# Patient Record
Sex: MALE | Race: WHITE | Employment: OTHER | URBAN - METROPOLITAN AREA
[De-identification: names, ages, dates, MRNs, and addresses within clinical notes are randomized per-mention and may not be internally consistent; named-entity substitution may affect disease eponyms.]

---

## 2021-06-14 PROBLEM — K59.00 CONSTIPATION: Status: ACTIVE | Noted: 2021-06-14

## 2021-06-14 PROBLEM — K62.4 ANAL STENOSIS: Status: ACTIVE | Noted: 2021-06-14

## 2021-06-15 ENCOUNTER — HOSPITAL ENCOUNTER (OUTPATIENT)
Dept: CT IMAGING | Age: 86
Discharge: HOME | End: 2021-06-15
Attending: SURGERY
Payer: COMMERCIAL

## 2021-06-15 DIAGNOSIS — K59.00 CONSTIPATION, UNSPECIFIED CONSTIPATION TYPE: ICD-10-CM

## 2021-06-15 DIAGNOSIS — K62.4 ANAL STENOSIS: ICD-10-CM

## 2021-06-15 LAB — CREAT BLD-MCNC: 1.58 MG/DL (ref 0.8–1.5)

## 2021-06-15 PROCEDURE — 82565 ASSAY OF CREATININE: CPT

## 2021-06-15 PROCEDURE — 74177 CT ABD & PELVIS W/CONTRAST: CPT

## 2021-06-15 PROCEDURE — 74011000636 HC RX REV CODE- 636: Performed by: SURGERY

## 2021-06-15 PROCEDURE — 74011000258 HC RX REV CODE- 258: Performed by: SURGERY

## 2021-06-15 RX ORDER — SODIUM CHLORIDE 0.9 % (FLUSH) 0.9 %
10 SYRINGE (ML) INJECTION
Status: COMPLETED | OUTPATIENT
Start: 2021-06-15 | End: 2021-06-15

## 2021-06-15 RX ADMIN — Medication 10 ML: at 13:12

## 2021-06-15 RX ADMIN — IOPAMIDOL 100 ML: 755 INJECTION, SOLUTION INTRAVENOUS at 13:11

## 2021-06-15 RX ADMIN — DIATRIZOATE MEGLUMINE AND DIATRIZOATE SODIUM 15 ML: 660; 100 LIQUID ORAL; RECTAL at 13:12

## 2021-06-15 RX ADMIN — SODIUM CHLORIDE 100 ML: 900 INJECTION, SOLUTION INTRAVENOUS at 13:12

## 2021-06-21 PROBLEM — Z86.010 HX OF ADENOMATOUS COLONIC POLYPS: Status: ACTIVE | Noted: 2021-06-21

## 2022-03-18 PROBLEM — K59.00 CONSTIPATION: Status: ACTIVE | Noted: 2021-06-14

## 2022-03-18 PROBLEM — K62.4 ANAL STENOSIS: Status: ACTIVE | Noted: 2021-06-14

## 2022-03-19 PROBLEM — Z86.010 HX OF ADENOMATOUS COLONIC POLYPS: Status: ACTIVE | Noted: 2021-06-21

## 2023-12-14 ENCOUNTER — HOSPITAL ENCOUNTER (EMERGENCY)
Age: 88
Discharge: HOME | End: 2023-12-14
Attending: EMERGENCY MEDICINE
Payer: MEDICARE

## 2023-12-14 ENCOUNTER — APPOINTMENT (OUTPATIENT)
Dept: GENERAL RADIOLOGY | Age: 88
End: 2023-12-14
Payer: MEDICARE

## 2023-12-14 VITALS
TEMPERATURE: 97.3 F | WEIGHT: 152.12 LBS | HEIGHT: 64 IN | HEART RATE: 78 BPM | RESPIRATION RATE: 9 BRPM | SYSTOLIC BLOOD PRESSURE: 109 MMHG | OXYGEN SATURATION: 100 % | BODY MASS INDEX: 25.97 KG/M2 | DIASTOLIC BLOOD PRESSURE: 79 MMHG
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
## 2023-12-14 DIAGNOSIS — R42 LIGHTHEADED: ICD-10-CM

## 2023-12-14 LAB
ALBUMIN SERPL-MCNC: 2.6 G/DL (ref 3.2–4.6)
ALBUMIN/GLOB SERPL: 0.6 (ref 0.4–1.6)
ALP SERPL-CCNC: 70 U/L (ref 50–136)
ALT SERPL-CCNC: 17 U/L (ref 12–65)
ANION GAP SERPL CALC-SCNC: 4 MMOL/L (ref 2–11)
AST SERPL-CCNC: 26 U/L (ref 15–37)
BASOPHILS # BLD: 0 K/UL (ref 0–0.2)
BASOPHILS NFR BLD: 0 % (ref 0–2)
BILIRUB SERPL-MCNC: 0.4 MG/DL (ref 0.2–1.1)
BUN SERPL-MCNC: 27 MG/DL (ref 8–23)
CALCIUM SERPL-MCNC: 8 MG/DL (ref 8.3–10.4)
CHLORIDE SERPL-SCNC: 104 MMOL/L (ref 103–113)
CO2 SERPL-SCNC: 29 MMOL/L (ref 21–32)
CREAT SERPL-MCNC: 1.5 MG/DL (ref 0.8–1.5)
DIFFERENTIAL METHOD BLD: ABNORMAL
EKG ATRIAL RATE: 0 BPM
EKG DIAGNOSIS: NORMAL
EKG Q-T INTERVAL: 444 MS
EKG QRS DURATION: 128 MS
EKG QTC CALCULATION (BAZETT): 490 MS
EKG R AXIS: -66 DEGREES
EKG T AXIS: 82 DEGREES
EKG VENTRICULAR RATE: 73 BPM
EOSINOPHIL # BLD: 0 K/UL (ref 0–0.8)
EOSINOPHIL NFR BLD: 0 % (ref 0.5–7.8)
ERYTHROCYTE [DISTWIDTH] IN BLOOD BY AUTOMATED COUNT: 13.5 % (ref 11.9–14.6)
FLUAV RNA SPEC QL NAA+PROBE: NOT DETECTED
FLUBV RNA SPEC QL NAA+PROBE: NOT DETECTED
GLOBULIN SER CALC-MCNC: 4.1 G/DL (ref 2.8–4.5)
GLUCOSE SERPL-MCNC: 144 MG/DL (ref 65–100)
HCT VFR BLD AUTO: 35.4 % (ref 41.1–50.3)
HGB BLD-MCNC: 11.6 G/DL (ref 13.6–17.2)
IMM GRANULOCYTES # BLD AUTO: 0 K/UL (ref 0–0.5)
IMM GRANULOCYTES NFR BLD AUTO: 0 % (ref 0–5)
LYMPHOCYTES # BLD: 1.4 K/UL (ref 0.5–4.6)
LYMPHOCYTES NFR BLD: 14 % (ref 13–44)
MCH RBC QN AUTO: 29.5 PG (ref 26.1–32.9)
MCHC RBC AUTO-ENTMCNC: 32.8 G/DL (ref 31.4–35)
MCV RBC AUTO: 90.1 FL (ref 82–102)
MONOCYTES # BLD: 0.5 K/UL (ref 0.1–1.3)
MONOCYTES NFR BLD: 5 % (ref 4–12)
NEUTS SEG # BLD: 8 K/UL (ref 1.7–8.2)
NEUTS SEG NFR BLD: 81 % (ref 43–78)
NRBC # BLD: 0 K/UL (ref 0–0.2)
PLATELET # BLD AUTO: 270 K/UL (ref 150–450)
PMV BLD AUTO: 10 FL (ref 9.4–12.3)
POTASSIUM SERPL-SCNC: 2.9 MMOL/L (ref 3.5–5.1)
PROT SERPL-MCNC: 6.7 G/DL (ref 6.3–8.2)
RBC # BLD AUTO: 3.93 M/UL (ref 4.23–5.6)
SARS-COV-2 RDRP RESP QL NAA+PROBE: NOT DETECTED
SODIUM SERPL-SCNC: 137 MMOL/L (ref 136–146)
SOURCE: NORMAL
WBC # BLD AUTO: 9.9 K/UL (ref 4.3–11.1)

## 2023-12-14 PROCEDURE — 87635 SARS-COV-2 COVID-19 AMP PRB: CPT

## 2023-12-14 PROCEDURE — 80053 COMPREHEN METABOLIC PANEL: CPT

## 2023-12-14 PROCEDURE — 96361 HYDRATE IV INFUSION ADD-ON: CPT

## 2023-12-14 PROCEDURE — 87502 INFLUENZA DNA AMP PROBE: CPT

## 2023-12-14 PROCEDURE — 85025 COMPLETE CBC W/AUTO DIFF WBC: CPT

## 2023-12-14 PROCEDURE — 96360 HYDRATION IV INFUSION INIT: CPT

## 2023-12-14 PROCEDURE — 2580000003 HC RX 258: Performed by: EMERGENCY MEDICINE

## 2023-12-14 PROCEDURE — 99285 EMERGENCY DEPT VISIT HI MDM: CPT

## 2023-12-14 PROCEDURE — 71045 X-RAY EXAM CHEST 1 VIEW: CPT

## 2023-12-14 RX ORDER — 0.9 % SODIUM CHLORIDE 0.9 %
1000 INTRAVENOUS SOLUTION INTRAVENOUS
Status: COMPLETED | OUTPATIENT
Start: 2023-12-14 | End: 2023-12-14

## 2023-12-14 RX ORDER — LOSARTAN POTASSIUM 25 MG/1
25 TABLET ORAL DAILY
COMMUNITY

## 2023-12-14 RX ADMIN — SODIUM CHLORIDE 1000 ML: 9 INJECTION, SOLUTION INTRAVENOUS at 13:58

## 2023-12-14 ASSESSMENT — PAIN - FUNCTIONAL ASSESSMENT: PAIN_FUNCTIONAL_ASSESSMENT: 0-10

## 2023-12-14 ASSESSMENT — LIFESTYLE VARIABLES
HOW MANY STANDARD DRINKS CONTAINING ALCOHOL DO YOU HAVE ON A TYPICAL DAY: PATIENT DOES NOT DRINK
HOW OFTEN DO YOU HAVE A DRINK CONTAINING ALCOHOL: NEVER

## 2023-12-14 ASSESSMENT — PAIN SCALES - GENERAL: PAINLEVEL_OUTOF10: 0

## 2023-12-14 NOTE — ED TRIAGE NOTES
Pt arrives via [facility] from [facility] where staff called out for frequent falls. Pt states \"my legs dont work well I've got arthritis and I've been getting dizzy\". Pt denies hitting head or LOC. Denies blood thinners. Denies pain.

## 2023-12-14 NOTE — ED PROVIDER NOTES
6.3 - 8.2 g/dL    Albumin 2.6 (L) 3.2 - 4.6 g/dL    Globulin 4.1 2.8 - 4.5 g/dL    Albumin/Globulin Ratio 0.6 0.4 - 1.6     EKG 12 Lead   Result Value Ref Range    Ventricular Rate 73 BPM    Atrial Rate 0 BPM    QRS Duration 128 ms    Q-T Interval 444 ms    QTc Calculation (Bazett) 490 ms    R Axis -66 degrees    T Axis 82 degrees    Diagnosis       Atrial fibrillation  Ventricular premature complex  Nonspecific IVCD with LAD  Left ventricular hypertrophy          XR CHEST PORTABLE   Final Result   No acute findings in the chest                           Voice dictation software was used during the making of this note. This software is not perfect and grammatical and other typographical errors may be present. This note has not been completely proofread for errors.      Saniya Lawrence MD  12/14/23 4784

## 2024-01-02 ENCOUNTER — APPOINTMENT (OUTPATIENT)
Dept: GENERAL RADIOLOGY | Age: 89
End: 2024-01-02
Payer: MEDICARE

## 2024-01-02 ENCOUNTER — HOSPITAL ENCOUNTER (INPATIENT)
Age: 89
LOS: 9 days | Discharge: NURSING, REHAB OR LONG-TERM CARE | End: 2024-01-11
Attending: EMERGENCY MEDICINE
Payer: MEDICARE

## 2024-01-02 ENCOUNTER — APPOINTMENT (OUTPATIENT)
Dept: CT IMAGING | Age: 89
End: 2024-01-02
Payer: MEDICARE

## 2024-01-02 DIAGNOSIS — R41.82 ALTERED MENTAL STATUS, UNSPECIFIED ALTERED MENTAL STATUS TYPE: Primary | ICD-10-CM

## 2024-01-02 DIAGNOSIS — T68.XXXA HYPOTHERMIA, INITIAL ENCOUNTER: ICD-10-CM

## 2024-01-02 DIAGNOSIS — R00.1 BRADYCARDIA: ICD-10-CM

## 2024-01-02 LAB
ALBUMIN SERPL-MCNC: 2.9 G/DL (ref 3.2–4.6)
ALBUMIN/GLOB SERPL: 0.8 (ref 0.4–1.6)
ALP SERPL-CCNC: 86 U/L (ref 50–136)
ALT SERPL-CCNC: 16 U/L (ref 12–65)
AMMONIA PLAS-SCNC: <10 UMOL/L (ref 11–32)
AMPHET UR QL SCN: NEGATIVE
ANION GAP SERPL CALC-SCNC: 5 MMOL/L (ref 2–11)
APPEARANCE UR: CLEAR
AST SERPL-CCNC: 19 U/L (ref 15–37)
BACTERIA URNS QL MICRO: NEGATIVE /HPF
BARBITURATES UR QL SCN: NEGATIVE
BASOPHILS # BLD: 0.1 K/UL (ref 0–0.2)
BASOPHILS NFR BLD: 1 % (ref 0–2)
BENZODIAZ UR QL: NEGATIVE
BILIRUB SERPL-MCNC: 0.5 MG/DL (ref 0.2–1.1)
BILIRUB UR QL: NEGATIVE
BILIRUB UR QL: NEGATIVE
BUN SERPL-MCNC: 23 MG/DL (ref 8–23)
CALCIUM SERPL-MCNC: 8.1 MG/DL (ref 8.3–10.4)
CANNABINOIDS UR QL SCN: NEGATIVE
CASTS URNS QL MICRO: ABNORMAL /LPF
CHLORIDE SERPL-SCNC: 109 MMOL/L (ref 103–113)
CHOLEST SERPL-MCNC: 165 MG/DL
CO2 SERPL-SCNC: 26 MMOL/L (ref 21–32)
COCAINE UR QL SCN: NEGATIVE
COLOR UR: ABNORMAL
CREAT SERPL-MCNC: 1.5 MG/DL (ref 0.8–1.5)
DIFFERENTIAL METHOD BLD: ABNORMAL
EKG ATRIAL RATE: 76 BPM
EKG DIAGNOSIS: NORMAL
EKG P AXIS: 0 DEGREES
EKG P-R INTERVAL: 78 MS
EKG Q-T INTERVAL: 474 MS
EKG QRS DURATION: 119 MS
EKG QTC CALCULATION (BAZETT): 523 MS
EKG R AXIS: -59 DEGREES
EKG T AXIS: 66 DEGREES
EKG VENTRICULAR RATE: 73 BPM
EOSINOPHIL # BLD: 0.3 K/UL (ref 0–0.8)
EOSINOPHIL NFR BLD: 4 % (ref 0.5–7.8)
EPI CELLS #/AREA URNS HPF: ABNORMAL /HPF
ERYTHROCYTE [DISTWIDTH] IN BLOOD BY AUTOMATED COUNT: 14.7 % (ref 11.9–14.6)
EST. AVERAGE GLUCOSE BLD GHB EST-MCNC: 117 MG/DL
ETHANOL SERPL-MCNC: <3 MG/DL (ref 0–0.08)
FLUAV RNA SPEC QL NAA+PROBE: NOT DETECTED
FLUBV RNA SPEC QL NAA+PROBE: NOT DETECTED
FOLATE SERPL-MCNC: 6.9 NG/ML (ref 3.1–17.5)
GLOBULIN SER CALC-MCNC: 3.7 G/DL (ref 2.8–4.5)
GLUCOSE BLD STRIP.AUTO-MCNC: 95 MG/DL (ref 65–100)
GLUCOSE SERPL-MCNC: 95 MG/DL (ref 65–100)
GLUCOSE UR QL STRIP.AUTO: NEGATIVE MG/DL
GLUCOSE UR STRIP.AUTO-MCNC: NEGATIVE MG/DL
HBA1C MFR BLD: 5.7 % (ref 4.8–5.6)
HCT VFR BLD AUTO: 36 % (ref 41.1–50.3)
HDLC SERPL-MCNC: 97 MG/DL (ref 40–60)
HDLC SERPL: 1.7
HGB BLD-MCNC: 11.3 G/DL (ref 13.6–17.2)
HGB UR QL STRIP: NEGATIVE
HIV 1+2 AB+HIV1 P24 AG SERPL QL IA: NONREACTIVE
HIV 1/2 RESULT COMMENT: NORMAL
IMM GRANULOCYTES # BLD AUTO: 0 K/UL (ref 0–0.5)
IMM GRANULOCYTES NFR BLD AUTO: 0 % (ref 0–5)
KETONES UR QL STRIP.AUTO: NEGATIVE MG/DL
KETONES UR-MCNC: NEGATIVE MG/DL
LACTATE SERPL-SCNC: 0.3 MMOL/L (ref 0.4–2)
LDLC SERPL CALC-MCNC: 58.4 MG/DL
LEUKOCYTE ESTERASE UR QL STRIP.AUTO: NEGATIVE
LEUKOCYTE ESTERASE UR QL STRIP: NEGATIVE
LIPASE SERPL-CCNC: 80 U/L (ref 73–393)
LYMPHOCYTES # BLD: 0.9 K/UL (ref 0.5–4.6)
LYMPHOCYTES NFR BLD: 12 % (ref 13–44)
MCH RBC QN AUTO: 29.1 PG (ref 26.1–32.9)
MCHC RBC AUTO-ENTMCNC: 31.4 G/DL (ref 31.4–35)
MCV RBC AUTO: 92.8 FL (ref 82–102)
METHADONE UR QL: NEGATIVE
MONOCYTES # BLD: 0.6 K/UL (ref 0.1–1.3)
MONOCYTES NFR BLD: 8 % (ref 4–12)
NEUTS SEG # BLD: 5.5 K/UL (ref 1.7–8.2)
NEUTS SEG NFR BLD: 75 % (ref 43–78)
NITRITE UR QL STRIP.AUTO: NEGATIVE
NITRITE UR QL: NEGATIVE
NRBC # BLD: 0 K/UL (ref 0–0.2)
NT PRO BNP: 943 PG/ML
OPIATES UR QL: NEGATIVE
PCP UR QL: NEGATIVE
PH UR STRIP: 7.5 (ref 5–9)
PH UR: 7.5 (ref 5–9)
PLATELET # BLD AUTO: 259 K/UL (ref 150–450)
PMV BLD AUTO: 9.7 FL (ref 9.4–12.3)
POTASSIUM SERPL-SCNC: 4.1 MMOL/L (ref 3.5–5.1)
PROT SERPL-MCNC: 6.6 G/DL (ref 6.3–8.2)
PROT UR QL: 30 MG/DL
PROT UR STRIP-MCNC: 30 MG/DL
RBC # BLD AUTO: 3.88 M/UL (ref 4.23–5.6)
RBC # UR STRIP: NEGATIVE
RBC #/AREA URNS HPF: ABNORMAL /HPF
SARS-COV-2 RDRP RESP QL NAA+PROBE: NOT DETECTED
SERVICE CMNT-IMP: ABNORMAL
SERVICE CMNT-IMP: NORMAL
SODIUM SERPL-SCNC: 140 MMOL/L (ref 136–146)
SOURCE: NORMAL
SP GR UR REFRACTOMETRY: 1.01 (ref 1–1.02)
SP GR UR: 1.02 (ref 1–1.02)
TRIGL SERPL-MCNC: 48 MG/DL (ref 35–150)
TROPONIN I SERPL HS-MCNC: 11.4 PG/ML (ref 0–14)
TROPONIN I SERPL HS-MCNC: 11.8 PG/ML (ref 0–14)
TSH W FREE THYROID IF ABNORMAL: 2.53 UIU/ML (ref 0.36–3.74)
UROBILINOGEN UR QL STRIP.AUTO: 1 EU/DL (ref 0.2–1)
UROBILINOGEN UR QL: 0.2 EU/DL (ref 0.2–1)
VIT B12 SERPL-MCNC: 436 PG/ML (ref 193–986)
VLDLC SERPL CALC-MCNC: 9.6 MG/DL (ref 6–23)
WBC # BLD AUTO: 7.4 K/UL (ref 4.3–11.1)
WBC URNS QL MICRO: ABNORMAL /HPF

## 2024-01-02 PROCEDURE — 84443 ASSAY THYROID STIM HORMONE: CPT

## 2024-01-02 PROCEDURE — 2580000003 HC RX 258: Performed by: STUDENT IN AN ORGANIZED HEALTH CARE EDUCATION/TRAINING PROGRAM

## 2024-01-02 PROCEDURE — 96376 TX/PRO/DX INJ SAME DRUG ADON: CPT

## 2024-01-02 PROCEDURE — 85025 COMPLETE CBC W/AUTO DIFF WBC: CPT

## 2024-01-02 PROCEDURE — 96375 TX/PRO/DX INJ NEW DRUG ADDON: CPT

## 2024-01-02 PROCEDURE — 93010 ELECTROCARDIOGRAM REPORT: CPT | Performed by: INTERNAL MEDICINE

## 2024-01-02 PROCEDURE — 83605 ASSAY OF LACTIC ACID: CPT

## 2024-01-02 PROCEDURE — 2580000003 HC RX 258: Performed by: EMERGENCY MEDICINE

## 2024-01-02 PROCEDURE — 93005 ELECTROCARDIOGRAM TRACING: CPT | Performed by: EMERGENCY MEDICINE

## 2024-01-02 PROCEDURE — 82746 ASSAY OF FOLIC ACID SERUM: CPT

## 2024-01-02 PROCEDURE — 86592 SYPHILIS TEST NON-TREP QUAL: CPT

## 2024-01-02 PROCEDURE — 87389 HIV-1 AG W/HIV-1&-2 AB AG IA: CPT

## 2024-01-02 PROCEDURE — 71045 X-RAY EXAM CHEST 1 VIEW: CPT

## 2024-01-02 PROCEDURE — 83690 ASSAY OF LIPASE: CPT

## 2024-01-02 PROCEDURE — 6360000002 HC RX W HCPCS: Performed by: EMERGENCY MEDICINE

## 2024-01-02 PROCEDURE — 82962 GLUCOSE BLOOD TEST: CPT

## 2024-01-02 PROCEDURE — 83880 ASSAY OF NATRIURETIC PEPTIDE: CPT

## 2024-01-02 PROCEDURE — A4216 STERILE WATER/SALINE, 10 ML: HCPCS | Performed by: EMERGENCY MEDICINE

## 2024-01-02 PROCEDURE — 80061 LIPID PANEL: CPT

## 2024-01-02 PROCEDURE — 84484 ASSAY OF TROPONIN QUANT: CPT

## 2024-01-02 PROCEDURE — 99285 EMERGENCY DEPT VISIT HI MDM: CPT

## 2024-01-02 PROCEDURE — 82077 ASSAY SPEC XCP UR&BREATH IA: CPT

## 2024-01-02 PROCEDURE — 83036 HEMOGLOBIN GLYCOSYLATED A1C: CPT

## 2024-01-02 PROCEDURE — 80053 COMPREHEN METABOLIC PANEL: CPT

## 2024-01-02 PROCEDURE — 81003 URINALYSIS AUTO W/O SCOPE: CPT

## 2024-01-02 PROCEDURE — 1100000000 HC RM PRIVATE

## 2024-01-02 PROCEDURE — 80307 DRUG TEST PRSMV CHEM ANLYZR: CPT

## 2024-01-02 PROCEDURE — 87040 BLOOD CULTURE FOR BACTERIA: CPT

## 2024-01-02 PROCEDURE — 82607 VITAMIN B-12: CPT

## 2024-01-02 PROCEDURE — 87502 INFLUENZA DNA AMP PROBE: CPT

## 2024-01-02 PROCEDURE — 6360000002 HC RX W HCPCS: Performed by: NURSE PRACTITIONER

## 2024-01-02 PROCEDURE — 82140 ASSAY OF AMMONIA: CPT

## 2024-01-02 PROCEDURE — 2580000003 HC RX 258: Performed by: NURSE PRACTITIONER

## 2024-01-02 PROCEDURE — 87635 SARS-COV-2 COVID-19 AMP PRB: CPT

## 2024-01-02 PROCEDURE — 6360000002 HC RX W HCPCS: Performed by: STUDENT IN AN ORGANIZED HEALTH CARE EDUCATION/TRAINING PROGRAM

## 2024-01-02 PROCEDURE — 96365 THER/PROPH/DIAG IV INF INIT: CPT

## 2024-01-02 PROCEDURE — 70450 CT HEAD/BRAIN W/O DYE: CPT

## 2024-01-02 PROCEDURE — 81001 URINALYSIS AUTO W/SCOPE: CPT

## 2024-01-02 RX ORDER — LOSARTAN POTASSIUM 25 MG/1
25 TABLET ORAL DAILY
Status: DISCONTINUED | OUTPATIENT
Start: 2024-01-03 | End: 2024-01-11 | Stop reason: HOSPADM

## 2024-01-02 RX ORDER — ACETAMINOPHEN 325 MG/1
650 TABLET ORAL EVERY 6 HOURS PRN
Status: DISCONTINUED | OUTPATIENT
Start: 2024-01-02 | End: 2024-01-11 | Stop reason: HOSPADM

## 2024-01-02 RX ORDER — ENOXAPARIN SODIUM 100 MG/ML
30 INJECTION SUBCUTANEOUS EVERY 24 HOURS
Status: DISCONTINUED | OUTPATIENT
Start: 2024-01-02 | End: 2024-01-06

## 2024-01-02 RX ORDER — SODIUM CHLORIDE, SODIUM LACTATE, POTASSIUM CHLORIDE, CALCIUM CHLORIDE 600; 310; 30; 20 MG/100ML; MG/100ML; MG/100ML; MG/100ML
INJECTION, SOLUTION INTRAVENOUS CONTINUOUS
Status: DISCONTINUED | OUTPATIENT
Start: 2024-01-02 | End: 2024-01-04

## 2024-01-02 RX ORDER — SODIUM CHLORIDE 0.9 % (FLUSH) 0.9 %
5-40 SYRINGE (ML) INJECTION EVERY 12 HOURS SCHEDULED
Status: DISCONTINUED | OUTPATIENT
Start: 2024-01-02 | End: 2024-01-11 | Stop reason: HOSPADM

## 2024-01-02 RX ORDER — ONDANSETRON 4 MG/1
4 TABLET, ORALLY DISINTEGRATING ORAL EVERY 8 HOURS PRN
Status: DISCONTINUED | OUTPATIENT
Start: 2024-01-02 | End: 2024-01-11 | Stop reason: HOSPADM

## 2024-01-02 RX ORDER — LORAZEPAM 2 MG/ML
0.5 INJECTION INTRAMUSCULAR ONCE
Status: DISCONTINUED | OUTPATIENT
Start: 2024-01-02 | End: 2024-01-02

## 2024-01-02 RX ORDER — SODIUM CHLORIDE 9 MG/ML
INJECTION, SOLUTION INTRAVENOUS PRN
Status: DISCONTINUED | OUTPATIENT
Start: 2024-01-02 | End: 2024-01-11 | Stop reason: HOSPADM

## 2024-01-02 RX ORDER — GUAIFENESIN 600 MG/1
1200 TABLET, EXTENDED RELEASE ORAL 2 TIMES DAILY
Status: DISCONTINUED | OUTPATIENT
Start: 2024-01-02 | End: 2024-01-11 | Stop reason: HOSPADM

## 2024-01-02 RX ORDER — ONDANSETRON 2 MG/ML
4 INJECTION INTRAMUSCULAR; INTRAVENOUS EVERY 6 HOURS PRN
Status: DISCONTINUED | OUTPATIENT
Start: 2024-01-02 | End: 2024-01-11 | Stop reason: HOSPADM

## 2024-01-02 RX ORDER — LORAZEPAM 2 MG/ML
0.5 INJECTION INTRAMUSCULAR ONCE
Status: COMPLETED | OUTPATIENT
Start: 2024-01-02 | End: 2024-01-02

## 2024-01-02 RX ORDER — AMLODIPINE BESYLATE 10 MG/1
10 TABLET ORAL DAILY
Status: DISCONTINUED | OUTPATIENT
Start: 2024-01-03 | End: 2024-01-11 | Stop reason: HOSPADM

## 2024-01-02 RX ORDER — ACETAMINOPHEN 650 MG/1
650 SUPPOSITORY RECTAL EVERY 6 HOURS PRN
Status: DISCONTINUED | OUTPATIENT
Start: 2024-01-02 | End: 2024-01-11 | Stop reason: HOSPADM

## 2024-01-02 RX ORDER — 0.9 % SODIUM CHLORIDE 0.9 %
30 INTRAVENOUS SOLUTION INTRAVENOUS
Status: COMPLETED | OUTPATIENT
Start: 2024-01-02 | End: 2024-01-02

## 2024-01-02 RX ORDER — 0.9 % SODIUM CHLORIDE 0.9 %
1000 INTRAVENOUS SOLUTION INTRAVENOUS ONCE
Status: COMPLETED | OUTPATIENT
Start: 2024-01-02 | End: 2024-01-02

## 2024-01-02 RX ORDER — POLYETHYLENE GLYCOL 3350 17 G/17G
17 POWDER, FOR SOLUTION ORAL DAILY PRN
Status: DISCONTINUED | OUTPATIENT
Start: 2024-01-02 | End: 2024-01-11 | Stop reason: HOSPADM

## 2024-01-02 RX ORDER — SODIUM CHLORIDE 0.9 % (FLUSH) 0.9 %
5-40 SYRINGE (ML) INJECTION PRN
Status: DISCONTINUED | OUTPATIENT
Start: 2024-01-02 | End: 2024-01-11 | Stop reason: HOSPADM

## 2024-01-02 RX ADMIN — ENOXAPARIN SODIUM 30 MG: 100 INJECTION SUBCUTANEOUS at 23:44

## 2024-01-02 RX ADMIN — WATER 5 MG: 1 INJECTION INTRAMUSCULAR; INTRAVENOUS; SUBCUTANEOUS at 23:20

## 2024-01-02 RX ADMIN — SODIUM CHLORIDE 1000 ML: 9 INJECTION, SOLUTION INTRAVENOUS at 08:02

## 2024-01-02 RX ADMIN — SODIUM CHLORIDE 2097 ML: 9 INJECTION, SOLUTION INTRAVENOUS at 09:51

## 2024-01-02 RX ADMIN — SODIUM CHLORIDE, PRESERVATIVE FREE 10 ML: 5 INJECTION INTRAVENOUS at 23:26

## 2024-01-02 RX ADMIN — SODIUM CHLORIDE, POTASSIUM CHLORIDE, SODIUM LACTATE AND CALCIUM CHLORIDE: 600; 310; 30; 20 INJECTION, SOLUTION INTRAVENOUS at 23:29

## 2024-01-02 RX ADMIN — SODIUM CHLORIDE 1 MG: 9 INJECTION INTRAMUSCULAR; INTRAVENOUS; SUBCUTANEOUS at 10:29

## 2024-01-02 RX ADMIN — CEFTRIAXONE SODIUM 2000 MG: 2 INJECTION, POWDER, FOR SOLUTION INTRAMUSCULAR; INTRAVENOUS at 08:09

## 2024-01-02 RX ADMIN — LORAZEPAM 0.5 MG: 2 INJECTION INTRAMUSCULAR; INTRAVENOUS at 09:55

## 2024-01-02 ASSESSMENT — PAIN - FUNCTIONAL ASSESSMENT: PAIN_FUNCTIONAL_ASSESSMENT: NONE - DENIES PAIN

## 2024-01-02 NOTE — ED NOTES
Lab called to do all new lab draws as add ons other than RPR and HIV.  Lab sending tiger tops for these labs.     Pretty Avina RN  01/02/24 1104

## 2024-01-02 NOTE — CARE COORDINATION
Patient is unable to participate in conversation with CM at this time.  Medical record indicated patient arrives from a shelter.

## 2024-01-02 NOTE — ED TRIAGE NOTES
To ED from shelter via EMS, c/o weakness starting last night, continued extremely weakness this am, FSBS 106, vitals stable en route, negative NIH, pt reports cough x4 months and weakness x1 month

## 2024-01-02 NOTE — ED PROVIDER NOTES
Emergency Department Provider Note       PCP: Rachael Sexton APRN - NP   Age: 88 y.o.   Sex: male     DISPOSITION Decision To Admit 01/02/2024 09:09:16 AM       ICD-10-CM    1. Altered mental status, unspecified altered mental status type  R41.82       2. Hypothermia, initial encounter  T68.XXXA           Medical Decision Making     Complexity of Problems Addressed:  1 or more acute illnesses that pose a threat to life or bodily function.     Data Reviewed and Analyzed:   I independently ordered and reviewed each unique test.  I reviewed external records: provider visit note from PCP.       I independently ordered and interpreted the ED EKG in the absence of a Cardiologist.    Rate: 73  EKG Interpretation: EKG Interpretation: sinus rhythm, no evidence of arrhythmia and no acute changes  ST Segments: Nonspecific ST segments - NO STEMI      I interpreted the X-rays CXR NO PNA.    Discussion of management or test interpretation.    Patient is a 71-year-old male who presents with weakness from a shelter last night and continues to have generalized weakness.  Patient had no reported strokelike symptoms and arrived via EMS.  Patient has had a cough for approximate 4 months and has had weakness x 1 month.  Patient appears to be confused to events.  Patient has a history of HTN, chest wall pain and stenosis of anal canal.        Differential diagnosis includes but is not limited to sepsis, electrolyte abnormality, hypothermia, UTI    Patient is laboratory testing is unremarkable and has no evidence of UTI or hyponatremia or other electrolyte abnormality.  Patient's chest x-ray is negative for pneumonia and CT head is negative for CVA.  Patient cannot recall the year and is hyperal thermic with a temperature 94.6.  Patient was placed on a Maria Eugenia hugger.  We will admit for altered mental status and as well as hypokalemia and bear hugger has been applied to patient.        Risk of Complications and/or Morbidity of Patient  degrees    T Axis 66 degrees    Diagnosis       !!! Poor data quality, interpretation may be adversely affected  Sinus rhythm  Short MO interval  Left anterior fascicular block    Confirmed by LEVY PRATER (), REHAN KNOWLES ([id number]) on 1/2/2024 8:10:30 AM          CT Head W/O Contrast   Final Result   No CT evidence of acute intracranial abnormality.      XR CHEST PORTABLE   Final Result      No infiltrates or consolidation.                     NIH Stroke Scale  Interval: Baseline  Level of Consciousness (1a): Alert  LOC Questions (1b): Answers both correctly  LOC Commands (1c): Performs both tasks correctly  Best Gaze (2): Normal  Visual (3): No visual loss  Facial Palsy (4): Normal symmetrical movement  Motor Arm, Left (5a): No drift  Motor Arm, Right (5b): No drift  Motor Leg, Left (6a): No drift  Motor Leg, Right (6b): No drift  Limb Ataxia (7): Absent  Sensory (8): Normal  Best Language (9): No aphasia  Dysarthria (10): Mild to moderate, slurs some words  Extinction and Inattention (11): No abnormality  Total: 1            Voice dictation software was used during the making of this note.  This software is not perfect and grammatical and other typographical errors may be present.  This note has not been completely proofread for errors.      Honey Lua MD  01/02/24 0912       Honey Lua MD  01/02/24 0934

## 2024-01-03 ENCOUNTER — APPOINTMENT (OUTPATIENT)
Dept: NON INVASIVE DIAGNOSTICS | Age: 89
End: 2024-01-03
Attending: INTERNAL MEDICINE
Payer: MEDICARE

## 2024-01-03 LAB
ANION GAP SERPL CALC-SCNC: 5 MMOL/L (ref 2–11)
BASOPHILS # BLD: 0 K/UL (ref 0–0.2)
BASOPHILS NFR BLD: 1 % (ref 0–2)
BUN SERPL-MCNC: 17 MG/DL (ref 8–23)
CALCIUM SERPL-MCNC: 8.5 MG/DL (ref 8.3–10.4)
CHLORIDE SERPL-SCNC: 109 MMOL/L (ref 103–113)
CO2 SERPL-SCNC: 23 MMOL/L (ref 21–32)
CREAT SERPL-MCNC: 1 MG/DL (ref 0.8–1.5)
DIFFERENTIAL METHOD BLD: ABNORMAL
ECHO AO ASC DIAM: 3.1 CM
ECHO AO ROOT DIAM: 3.6 CM
ECHO AV AREA PEAK VELOCITY: 2.2 CM2
ECHO AV AREA VTI: 2.4 CM2
ECHO AV MEAN GRADIENT: 3 MMHG
ECHO AV MEAN VELOCITY: 0.8 M/S
ECHO AV PEAK GRADIENT: 5 MMHG
ECHO AV PEAK VELOCITY: 1.1 M/S
ECHO AV VELOCITY RATIO: 0.64
ECHO AV VTI: 29.1 CM
ECHO EST RA PRESSURE: 5 MMHG
ECHO LA AREA 2C: 20.5 CM2
ECHO LA AREA 4C: 21.4 CM2
ECHO LA DIAMETER: 4.1 CM
ECHO LA MAJOR AXIS: 6.1 CM
ECHO LA MINOR AXIS: 5.6 CM
ECHO LA TO AORTIC ROOT RATIO: 1.14
ECHO LA VOL BP: 62 ML (ref 18–58)
ECHO LA VOL MOD A2C: 63 ML (ref 18–58)
ECHO LA VOL MOD A4C: 57 ML (ref 18–58)
ECHO LV E' LATERAL VELOCITY: 6 CM/S
ECHO LV E' SEPTAL VELOCITY: 6 CM/S
ECHO LV EDV A2C: 122 ML
ECHO LV EDV A4C: 100 ML
ECHO LV EJECTION FRACTION A2C: 62 %
ECHO LV EJECTION FRACTION A4C: 61 %
ECHO LV EJECTION FRACTION BIPLANE: 63 % (ref 55–100)
ECHO LV ESV A2C: 46 ML
ECHO LV ESV A4C: 39 ML
ECHO LV FRACTIONAL SHORTENING: 17 % (ref 28–44)
ECHO LV INTERNAL DIMENSION DIASTOLIC: 5.2 CM (ref 4.2–5.9)
ECHO LV INTERNAL DIMENSION SYSTOLIC: 4.3 CM
ECHO LV IVSD: 0.9 CM (ref 0.6–1)
ECHO LV MASS 2D: 181.4 G (ref 88–224)
ECHO LV POSTERIOR WALL DIASTOLIC: 1 CM (ref 0.6–1)
ECHO LV RELATIVE WALL THICKNESS RATIO: 0.38
ECHO LVOT AREA: 3.5 CM2
ECHO LVOT AV VTI INDEX: 0.69
ECHO LVOT DIAM: 2.1 CM
ECHO LVOT MEAN GRADIENT: 1 MMHG
ECHO LVOT PEAK GRADIENT: 2 MMHG
ECHO LVOT PEAK VELOCITY: 0.7 M/S
ECHO LVOT SV: 69.2 ML
ECHO LVOT VTI: 20 CM
ECHO MV A VELOCITY: 0.89 M/S
ECHO MV E DECELERATION TIME (DT): 272 MS
ECHO MV E VELOCITY: 0.71 M/S
ECHO MV E/A RATIO: 0.8
ECHO MV E/E' LATERAL: 11.83
ECHO MV E/E' RATIO (AVERAGED): 11.83
ECHO PV ACCELERATION TIME (AT): 100 MS
ECHO PV MAX VELOCITY: 0.6 M/S
ECHO PV PEAK GRADIENT: 2 MMHG
ECHO RIGHT VENTRICULAR SYSTOLIC PRESSURE (RVSP): 25 MMHG
ECHO RV BASAL DIMENSION: 4 CM
ECHO RV FREE WALL PEAK S': 11 CM/S
ECHO RV TAPSE: 2.1 CM (ref 1.7–?)
ECHO TV REGURGITANT MAX VELOCITY: 2.26 M/S
ECHO TV REGURGITANT PEAK GRADIENT: 20 MMHG
EOSINOPHIL # BLD: 0.2 K/UL (ref 0–0.8)
EOSINOPHIL NFR BLD: 4 % (ref 0.5–7.8)
ERYTHROCYTE [DISTWIDTH] IN BLOOD BY AUTOMATED COUNT: 14.8 % (ref 11.9–14.6)
GLUCOSE SERPL-MCNC: 86 MG/DL (ref 65–100)
HCT VFR BLD AUTO: 31.9 % (ref 41.1–50.3)
HGB BLD-MCNC: 10.1 G/DL (ref 13.6–17.2)
IMM GRANULOCYTES # BLD AUTO: 0 K/UL (ref 0–0.5)
IMM GRANULOCYTES NFR BLD AUTO: 0 % (ref 0–5)
LYMPHOCYTES # BLD: 0.9 K/UL (ref 0.5–4.6)
LYMPHOCYTES NFR BLD: 14 % (ref 13–44)
MAGNESIUM SERPL-MCNC: 2.6 MG/DL (ref 1.8–2.4)
MCH RBC QN AUTO: 29.1 PG (ref 26.1–32.9)
MCHC RBC AUTO-ENTMCNC: 31.7 G/DL (ref 31.4–35)
MCV RBC AUTO: 91.9 FL (ref 82–102)
MONOCYTES # BLD: 0.5 K/UL (ref 0.1–1.3)
MONOCYTES NFR BLD: 8 % (ref 4–12)
NEUTS SEG # BLD: 4.5 K/UL (ref 1.7–8.2)
NEUTS SEG NFR BLD: 73 % (ref 43–78)
NRBC # BLD: 0 K/UL (ref 0–0.2)
PLATELET # BLD AUTO: 211 K/UL (ref 150–450)
PMV BLD AUTO: 9.8 FL (ref 9.4–12.3)
POTASSIUM SERPL-SCNC: 3.4 MMOL/L (ref 3.5–5.1)
PROCALCITONIN SERPL-MCNC: <0.05 NG/ML (ref 0–0.49)
RBC # BLD AUTO: 3.47 M/UL (ref 4.23–5.6)
RPR SER QL: NONREACTIVE
SODIUM SERPL-SCNC: 137 MMOL/L (ref 136–146)
WBC # BLD AUTO: 6.1 K/UL (ref 4.3–11.1)

## 2024-01-03 PROCEDURE — 6360000002 HC RX W HCPCS: Performed by: INTERNAL MEDICINE

## 2024-01-03 PROCEDURE — 2500000003 HC RX 250 WO HCPCS: Performed by: INTERNAL MEDICINE

## 2024-01-03 PROCEDURE — 93005 ELECTROCARDIOGRAM TRACING: CPT | Performed by: INTERNAL MEDICINE

## 2024-01-03 PROCEDURE — 85025 COMPLETE CBC W/AUTO DIFF WBC: CPT

## 2024-01-03 PROCEDURE — 36415 COLL VENOUS BLD VENIPUNCTURE: CPT

## 2024-01-03 PROCEDURE — C8929 TTE W OR WO FOL WCON,DOPPLER: HCPCS

## 2024-01-03 PROCEDURE — 84145 PROCALCITONIN (PCT): CPT

## 2024-01-03 PROCEDURE — 83735 ASSAY OF MAGNESIUM: CPT

## 2024-01-03 PROCEDURE — 1100000003 HC PRIVATE W/ TELEMETRY

## 2024-01-03 PROCEDURE — 2580000003 HC RX 258: Performed by: NURSE PRACTITIONER

## 2024-01-03 PROCEDURE — 6360000002 HC RX W HCPCS: Performed by: STUDENT IN AN ORGANIZED HEALTH CARE EDUCATION/TRAINING PROGRAM

## 2024-01-03 PROCEDURE — 1100000000 HC RM PRIVATE

## 2024-01-03 PROCEDURE — 80048 BASIC METABOLIC PNL TOTAL CA: CPT

## 2024-01-03 PROCEDURE — 6360000004 HC RX CONTRAST MEDICATION: Performed by: INTERNAL MEDICINE

## 2024-01-03 PROCEDURE — 2580000003 HC RX 258: Performed by: INTERNAL MEDICINE

## 2024-01-03 PROCEDURE — 6360000002 HC RX W HCPCS: Performed by: NURSE PRACTITIONER

## 2024-01-03 RX ORDER — POTASSIUM CHLORIDE 7.45 MG/ML
10 INJECTION INTRAVENOUS
Status: COMPLETED | OUTPATIENT
Start: 2024-01-03 | End: 2024-01-03

## 2024-01-03 RX ADMIN — TUBERCULIN PURIFIED PROTEIN DERIVATIVE 5 UNITS: 5 INJECTION, SOLUTION INTRADERMAL at 11:41

## 2024-01-03 RX ADMIN — ENOXAPARIN SODIUM 30 MG: 100 INJECTION SUBCUTANEOUS at 20:49

## 2024-01-03 RX ADMIN — SODIUM CHLORIDE, POTASSIUM CHLORIDE, SODIUM LACTATE AND CALCIUM CHLORIDE: 600; 310; 30; 20 INJECTION, SOLUTION INTRAVENOUS at 11:46

## 2024-01-03 RX ADMIN — WATER 5 MG: 1 INJECTION INTRAMUSCULAR; INTRAVENOUS; SUBCUTANEOUS at 12:54

## 2024-01-03 RX ADMIN — POTASSIUM CHLORIDE 10 MEQ: 7.46 INJECTION, SOLUTION INTRAVENOUS at 15:41

## 2024-01-03 RX ADMIN — POTASSIUM CHLORIDE 10 MEQ: 7.46 INJECTION, SOLUTION INTRAVENOUS at 18:24

## 2024-01-03 RX ADMIN — POTASSIUM CHLORIDE 10 MEQ: 7.46 INJECTION, SOLUTION INTRAVENOUS at 16:28

## 2024-01-03 RX ADMIN — POTASSIUM CHLORIDE 10 MEQ: 7.46 INJECTION, SOLUTION INTRAVENOUS at 17:34

## 2024-01-03 RX ADMIN — SODIUM CHLORIDE, PRESERVATIVE FREE 0.3 ML: 5 INJECTION INTRAVENOUS at 14:39

## 2024-01-03 NOTE — PROGRESS NOTES
Physical Therapy Note:    Attempted to see patient this PM for physical therapy evaluation session. Patient still not appropriate to participate  per RN. Will follow and re-attempt as schedule permits/patient available. Thank you,    A Mandy Laws, PT     Rehab Caseload Tracker

## 2024-01-03 NOTE — PROGRESS NOTES
Writer unable to complete admission data base d/t patient current mental state. Patient arrived lethargic and non-verbal. Alert to voice; unable to follow commands. Sitter at bedside upon arrival to floor.

## 2024-01-03 NOTE — PROGRESS NOTES
1250: patient's friend called the desk stating that the patient is waking up and attempting to try to remove his IV. Upon entering the room, the patient is awake but disoriented, agitated and attempting to remove PIV and gown.  Attempted to re-directed patient multiple times. Called for assistance and to bring PRN zyprexa IM. Adriana REID came to the bedside. Patient stated that he needed to urinate. Educated patient on male pure wick that is in place. Patient confused and unable to understand at this time. Patient agitated and yelling to \"let me get up, Get out of my way\" and to \"stop touching me.\"    1254: zyprexa IM given. Patient still attempting to get out of bed. Patient agitated and attempting to still remove PIV.     1300: Raymond PRATER notified of patient condition. Requesting mitten order to help with patient from pulling lines/tubes. Awaiting orders.    1310: Raymond PRATER paged again due to patient become combative and attempting to hit staff and friend at the bedside. Tried to redirect patient, unsuccessful due to patient confused and upset. Requesting orders for bilateral wrist restraint instead of mittens.      1311: Raymond PRATRE ordered for bilateral wrist restraint at this time. Orders placed. Restraints placed on patient. Educated patient's friend, Israel, regarding new orders. Israel verbalized understanding. No questions at this time. Patient in bed, low and locked. No distress noted.

## 2024-01-03 NOTE — FLOWSHEET NOTE
Communication with Mali Grace NP,   This patient just arrived to the floor. Very AMS, sitter at bedside NPO. Can we get continous IVF ordered. Urine slighly dark. Albumin low.  Vitals 169/102 HR 85 temp 96.6 rectal (bear hugger in ED). Please advise. Thanks.    01/02/24 7849   Vital Signs   Temp (!) 96.6 °F (35.9 °C)   Temp Source Rectal   Pulse 85   Respirations 13   BP (!) 169/102   MAP (Calculated) 124   MAP (mmHg) 110

## 2024-01-03 NOTE — PROGRESS NOTES
Physical Therapy Note:    Attempted to see patient this AM for physical therapy evaluation session. Patient not appropriate to participate this morning per RN. Will follow and re-attempt as schedule permits/patient available. Thank you,    A Mandy Lwas, PT     Rehab Caseload Tracker

## 2024-01-03 NOTE — PROGRESS NOTES
Hospitalist Progress Note   Admit Date:  2024  6:30 AM   Name:  Khadar Wong   Age:  88 y.o.  Sex:  male  :  1935   MRN:  [id number]   Room:      Presenting/Chief Complaint: weakness     Reason(s) for Admission: Altered mental state [R41.82]  Hypothermia, initial encounter [T68.XXXA]  Altered mental status, unspecified altered mental status type [R41.82]     Hospital Course:   Khadar Wong is a 88 y.o. male with medical history of hypertension, stenosis of anal canal who presented with altered mental status.  Unable to obtain any further history due to patient's current baseline status.  Patient was brought in from [facility] with weakness and confusion.    In the ER, patient was found to be hypothermic with a temperature of 94.6.  Labs were grossly unremarkable.  Patient did receive 1 dose of antibiotics placed on Maria Eugenia hugger and he was admitted for further workup.      Subjective & 24hr Events:   Patient was seen and evaluated at bedside this morning.  He remains confused and disoriented and unable to verbalize much.  As per nursing staff, patient was noted to be bradycardic on vitals monitoring.  However, did not notify physician when this happened.      Assessment & Plan:     Altered mental status  Hypothermia  TSH is normal.  No AM cortisol ordered.  Mental status still not improved this morning.  Infectious workup negative.  UA is bland.  Chest x-ray is clear.  Blood cultures negative to date.  Status post 1 dose antibiotics in ER  -Follow-up procalcitonin  -Follow-up a.m. cortisol level  -Avoid sedating medications    Bradycardia  Heart rate documented in the 40s.  Currently not on telemetry monitoring.  Currently not on beta-blocker.  Patient was normothermic when he had these readings.  Patient was assessed at bedside.  Pulse was noted to be around 50-60 on manual palpation.  Echo shows normal EF, no significant abnormalities.   -Stat EKG  -Monitor on

## 2024-01-03 NOTE — PROGRESS NOTES
TRANSFER - IN REPORT:    Verbal report received from Rima brandon Khadar Wong  being received from ED for routine progression of patient care      Report consisted of patient's Situation, Background, Assessment and   Recommendations(SBAR).     Information from the following report(s) ED Encounter Summary, ED SBAR, MAR, and Recent Results was reviewed with the receiving nurse.    Opportunity for questions and clarification was provided.      Assessment completed upon patient's arrival to unit and care assumed.

## 2024-01-03 NOTE — PROGRESS NOTES
Attempted to see patient this PM for occupational therapy evaluation session. Patient agitated and in restraints. Will follow and re-attempt as schedule permits/patient available. Thank you,    Linette Olivia, OT    Rehab Caseload Tracker

## 2024-01-03 NOTE — ED NOTES
TRANSFER - OUT REPORT:    Verbal report given to RN on Khadar Wong  being transferred to [facility] for routine progression of patient care       Report consisted of patient's Situation, Background, Assessment and   Recommendations(SBAR).     Information from the following report(s) ED Encounter Summary was reviewed with the receiving nurse.    [place] Fall Assessment:    Presents to emergency department  because of falls (Syncope, seizure, or loss of consciousness): No  Age > 70: Yes  Altered Mental Status, Intoxication with alcohol or substance confusion (Disorientation, impaired judgment, poor safety awaremess, or inability to follow instructions): Yes  Impaired Mobility: Ambulates or transfers with assistive devices or assistance; Unable to ambulate or transer.: Yes  Nursing Judgement: Yes          Lines:   Peripheral IV 01/02/24 Left Antecubital (Active)   Site Assessment Clean, dry & intact 01/02/24 0707   Line Status Blood return noted;Normal saline locked 01/02/24 0707   Phlebitis Assessment No symptoms 01/02/24 0707   Infiltration Assessment 0 01/02/24 0707   Dressing Status Clean, dry & intact 01/02/24 0707   Dressing Type Transparent 01/02/24 0707       Peripheral IV 01/02/24 Right Antecubital (Active)        Opportunity for questions and clarification was provided.      Patient transported with:  Rima Mak RN  01/02/24 3300

## 2024-01-03 NOTE — CARE COORDINATION
RNCM: Patient unable to complete CM assessment. RNCM contacted Aftab @ [facility] ([phone]) to discuss discharge planning.    Per Aftab, patient is independent with ADLs at baseline and ambulates with no assistive devices. Aftab states that patient can return to [facility] when stable for discharge. CM following.    ADDENDUM:   RNOMAR spoke with [facility] manager who states that patient has no living family members. RNOMAR called both emergency contacts on file, no answer.     01/03/24 1146   Service Assessment   Patient Orientation Unable to Assess   Cognition Severely Impaired   History Provided By Other (see comment)  (Aftab @ [facility])   Primary Caregiver Self   Accompanied By/Relationship n/a   Support Systems Other (Comment)  ([facility] Staff)   Patient's Healthcare Decision Maker is: Legal Next of Kin   PCP Verified by CM Yes   Prior Functional Level Independent in ADLs/IADLs   Current Functional Level Assistance with the following:;Bathing;Dressing;Mobility;Toileting   Can patient return to prior living arrangement Yes   Ability to make needs known: Unable   Family able to assist with home care needs: Other (comment)  ([facility] staff)   Would you like for me to discuss the discharge plan with any other family members/significant others, and if so, who? No   Financial Resources Medicare;Medicaid   Community Resources Housing   Social/Functional History   Lives With Other (comment)   Type of Home Homeless

## 2024-01-03 NOTE — PROGRESS NOTES
Speech-Language Pathology    Speech therapy consulted to eval/treat. When engaged during AM, patient somnolent and unable to achieve wakeful state to participate in assessment. Speech therapy will follow up and perform evaluation, as patient is able to participate.     Liam Warner M.S., CCC-SLP

## 2024-01-04 LAB
AMMONIA PLAS-SCNC: 30 UMOL/L (ref 11–32)
ANION GAP SERPL CALC-SCNC: 8 MMOL/L (ref 2–11)
BASOPHILS # BLD: 0 K/UL (ref 0–0.2)
BASOPHILS NFR BLD: 0 % (ref 0–2)
BUN SERPL-MCNC: 16 MG/DL (ref 8–23)
CALCIUM SERPL-MCNC: 8.8 MG/DL (ref 8.3–10.4)
CHLORIDE SERPL-SCNC: 109 MMOL/L (ref 103–113)
CO2 SERPL-SCNC: 21 MMOL/L (ref 21–32)
CREAT SERPL-MCNC: 0.8 MG/DL (ref 0.8–1.5)
DIFFERENTIAL METHOD BLD: ABNORMAL
EKG ATRIAL RATE: 65 BPM
EKG DIAGNOSIS: NORMAL
EKG P AXIS: 58 DEGREES
EKG P-R INTERVAL: 182 MS
EKG Q-T INTERVAL: 490 MS
EKG QRS DURATION: 126 MS
EKG QTC CALCULATION (BAZETT): 509 MS
EKG R AXIS: -57 DEGREES
EKG T AXIS: 78 DEGREES
EKG VENTRICULAR RATE: 65 BPM
EOSINOPHIL # BLD: 0.1 K/UL (ref 0–0.8)
EOSINOPHIL NFR BLD: 2 % (ref 0.5–7.8)
ERYTHROCYTE [DISTWIDTH] IN BLOOD BY AUTOMATED COUNT: 14.6 % (ref 11.9–14.6)
GLUCOSE SERPL-MCNC: 105 MG/DL (ref 65–100)
HCT VFR BLD AUTO: 32.5 % (ref 41.1–50.3)
HGB BLD-MCNC: 10.5 G/DL (ref 13.6–17.2)
IMM GRANULOCYTES # BLD AUTO: 0 K/UL (ref 0–0.5)
IMM GRANULOCYTES NFR BLD AUTO: 0 % (ref 0–5)
LACTATE SERPL-SCNC: 0.8 MMOL/L (ref 0.4–2)
LYMPHOCYTES # BLD: 0.3 K/UL (ref 0.5–4.6)
LYMPHOCYTES NFR BLD: 6 % (ref 13–44)
MCH RBC QN AUTO: 29 PG (ref 26.1–32.9)
MCHC RBC AUTO-ENTMCNC: 32.3 G/DL (ref 31.4–35)
MCV RBC AUTO: 89.8 FL (ref 82–102)
MM INDURATION, POC: 0 MM (ref 0–5)
MONOCYTES # BLD: 0.2 K/UL (ref 0.1–1.3)
MONOCYTES NFR BLD: 4 % (ref 4–12)
NEUTS SEG # BLD: 4.8 K/UL (ref 1.7–8.2)
NEUTS SEG NFR BLD: 88 % (ref 43–78)
NRBC # BLD: 0 K/UL (ref 0–0.2)
PLATELET # BLD AUTO: 168 K/UL (ref 150–450)
PMV BLD AUTO: 9.6 FL (ref 9.4–12.3)
POTASSIUM SERPL-SCNC: 3.7 MMOL/L (ref 3.5–5.1)
PPD, POC: NEGATIVE
RBC # BLD AUTO: 3.62 M/UL (ref 4.23–5.6)
SODIUM SERPL-SCNC: 138 MMOL/L (ref 136–146)
WBC # BLD AUTO: 5.5 K/UL (ref 4.3–11.1)

## 2024-01-04 PROCEDURE — 83605 ASSAY OF LACTIC ACID: CPT

## 2024-01-04 PROCEDURE — 93010 ELECTROCARDIOGRAM REPORT: CPT | Performed by: INTERNAL MEDICINE

## 2024-01-04 PROCEDURE — 92610 EVALUATE SWALLOWING FUNCTION: CPT

## 2024-01-04 PROCEDURE — 2580000003 HC RX 258: Performed by: INTERNAL MEDICINE

## 2024-01-04 PROCEDURE — 82140 ASSAY OF AMMONIA: CPT

## 2024-01-04 PROCEDURE — 1100000003 HC PRIVATE W/ TELEMETRY

## 2024-01-04 PROCEDURE — 2580000003 HC RX 258: Performed by: NURSE PRACTITIONER

## 2024-01-04 PROCEDURE — 2580000003 HC RX 258: Performed by: STUDENT IN AN ORGANIZED HEALTH CARE EDUCATION/TRAINING PROGRAM

## 2024-01-04 PROCEDURE — 80048 BASIC METABOLIC PNL TOTAL CA: CPT

## 2024-01-04 PROCEDURE — 36415 COLL VENOUS BLD VENIPUNCTURE: CPT

## 2024-01-04 PROCEDURE — 82533 TOTAL CORTISOL: CPT

## 2024-01-04 PROCEDURE — 85025 COMPLETE CBC W/AUTO DIFF WBC: CPT

## 2024-01-04 PROCEDURE — 6360000002 HC RX W HCPCS: Performed by: STUDENT IN AN ORGANIZED HEALTH CARE EDUCATION/TRAINING PROGRAM

## 2024-01-04 RX ORDER — COSYNTROPIN 0.25 MG/ML
250 INJECTION, POWDER, FOR SOLUTION INTRAMUSCULAR; INTRAVENOUS ONCE
Status: COMPLETED | OUTPATIENT
Start: 2024-01-05 | End: 2024-01-05

## 2024-01-04 RX ORDER — SODIUM CHLORIDE, SODIUM LACTATE, POTASSIUM CHLORIDE, CALCIUM CHLORIDE 600; 310; 30; 20 MG/100ML; MG/100ML; MG/100ML; MG/100ML
INJECTION, SOLUTION INTRAVENOUS CONTINUOUS
Status: DISCONTINUED | OUTPATIENT
Start: 2024-01-04 | End: 2024-01-05

## 2024-01-04 RX ADMIN — SODIUM CHLORIDE, PRESERVATIVE FREE 10 ML: 5 INJECTION INTRAVENOUS at 08:03

## 2024-01-04 RX ADMIN — SODIUM CHLORIDE, PRESERVATIVE FREE 10 ML: 5 INJECTION INTRAVENOUS at 22:05

## 2024-01-04 RX ADMIN — ENOXAPARIN SODIUM 30 MG: 100 INJECTION SUBCUTANEOUS at 21:30

## 2024-01-04 RX ADMIN — SODIUM CHLORIDE, POTASSIUM CHLORIDE, SODIUM LACTATE AND CALCIUM CHLORIDE: 600; 310; 30; 20 INJECTION, SOLUTION INTRAVENOUS at 18:05

## 2024-01-04 RX ADMIN — SODIUM CHLORIDE, POTASSIUM CHLORIDE, SODIUM LACTATE AND CALCIUM CHLORIDE: 600; 310; 30; 20 INJECTION, SOLUTION INTRAVENOUS at 10:59

## 2024-01-04 RX ADMIN — SODIUM CHLORIDE, POTASSIUM CHLORIDE, SODIUM LACTATE AND CALCIUM CHLORIDE: 600; 310; 30; 20 INJECTION, SOLUTION INTRAVENOUS at 00:12

## 2024-01-04 NOTE — PROGRESS NOTES
END OF SHIFT NOTE:    INTAKE/OUTPUT  01/03 0701 - 01/04 0700  In: 1610.2 [I.V.:1610.2]  Out: 1550 [Urine:1550]  Voiding: Yes  Catheter: No  Drain:   External Urinary Catheter (Active)   Site Assessment Clean,dry & intact 01/03/24 2206   Placement Replaced 01/03/24 2206   Securement Method Securing device (Describe) 01/03/24 1554   Catheter Care Catheter/Wick replaced 01/03/24 2206   Perineal Care Yes 01/03/24 2206   Suction 40 mmgHg continuous 01/03/24 1554   Urine Color Marcy 01/04/24 0445   Urine Appearance Clear 01/04/24 0445   Output (mL) 200 mL 01/04/24 0445               Flatus: Patient does have flatus present.    Stool:  occurrences.    Characteristics:                Emesis:  occurrences.    Characteristics:        VITAL SIGNS  Patient Vitals for the past 12 hrs:   Temp Pulse Resp BP SpO2   01/04/24 0625 98.8 °F (37.1 °C) -- -- -- --   01/04/24 0545 (!) 96.6 °F (35.9 °C) 81 -- 120/60 96 %   01/04/24 0445 (!) 94.2 °F (34.6 °C) -- -- (!) 107/52 --   01/04/24 0415 (!) 92.6 °F (33.7 °C) -- -- -- --   01/04/24 0339 (!) 90.2 °F (32.3 °C) -- -- -- --   01/04/24 0320 -- 57 17 134/61 93 %   01/03/24 2351 -- 58 17 (!) 158/90 --   01/03/24 1926 -- 61 17 (!) 161/84 98 %       Pain Assessment             Ambulating  No    Shift report given to oncoming nurse at the bedside.    Mauricio Chavez RN

## 2024-01-04 NOTE — PROGRESS NOTES
Physician Progress Note      PATIENT:               OKSANA MORALES  [facility] #:                  [id number]  :                       1935  ADMIT DATE:       2024 6:30 AM  DISCH DATE:  RESPONDING  PROVIDER #:        Owen Andrade MD          QUERY TEXT:    Pt admitted with AMS. Pt noted to have confusion, weakness and hypothermia. If   possible, please document in the progress notes and discharge summary if you   are evaluating and / or treating any of the following:    The medical record reflects the following:  Risk Factors: 88 YOM, HTN, CKD3,  Clinical Indicators: Unable to obtain any further history due to patient's   current baseline status.  Patient was brought in from [facility]   with weakness and confusion. hypothermic with a temperature of 94.6. Labs were   grossly unremarkable.  Labs:  UDS neg  1/3 PN: He remains confused and disoriented and unable to verbalize much.   Infectious workup negative.  UA is bland.  Chest x-ray is clear.  Blood   cultures negative to date. Bradycardia HR 40s. A1C 5.7  Treatment: Maria Eugenia vani, IV Rocephin, avoid sedation medications,    Thank you,  Niki Muñoz RN,C BSN  Clinical   [email or web address]  Options provided:  -- Encephalopathy due to, Please specify cause.  -- Hypertensive encephalopathy  -- Metabolic encephalopathy  -- Delirium due to, Please specify cause.  -- Delirium  -- Other - I will add my own diagnosis  -- Disagree - Not applicable / Not valid  -- Disagree - Clinically unable to determine / Unknown  -- Refer to Clinical Documentation Reviewer    PROVIDER RESPONSE TEXT:    This patient has metabolic encephalopathy.    Query created by: Ashley Muñoz on 1/3/2024 10:49 PM      Electronically signed by:  Owen Andrade MD 2024 6:55 AM

## 2024-01-04 NOTE — PROGRESS NOTES
Physical Therapy Note:    Attempted to see patient this AM for physical therapy evaluation session. Patient is in restraints and has been combative per nursing staff, will hold. Will follow and re-attempt as schedule permits/patient available. Thank you,    YONG Laws, PT     Rehab Caseload Tracker

## 2024-01-04 NOTE — PROGRESS NOTES
Brief Cross Cover Note    I was notified by the nursing staff around 0355 on 1/4/2024 due to hypothermia.  Despite being on the Maria Eugenia Hugger, patient remains hypothermic with Temperature of 90.2.  Nursing staff is concerned that the iv fluid may be contributing to the hypothermia.  Since the patient's blood pressure is stable in the range of 130/61, I instructed the nursing staff to discontinue IV fluid at this time.    Keely Beltran M.D.

## 2024-01-04 NOTE — PROGRESS NOTES
Goals:  LTG: Patient will tolerate least restrictive oral diet without overt s/sx of airway compromise.   STG: Patient will participate in ongoing po trials with SLP in attempt to identify least restrictive oral diet.   STG: Patient will participate in instrumental swallow assessment for evaluation of swallow function as medically indicated.     SPEECH LANGUAGE PATHOLOGY: DYSPHAGIA Initial Assessment    Acknowledge Order  I  Therapy Time  I   Charges     I  Rehab Caseload Tracker      NAME: Khadar Wong  : 1935  MRN: [id number]    ADMISSION DATE: 2024  PRIMARY DIAGNOSIS: Altered mental state    ICD-10: Treatment Diagnosis: R13.12 Dysphagia, Oropharyngeal Phase    RECOMMENDATIONS   Diet:    NPO    Medication: non-oral   Compensatory Swallowing Strategies:   Oral Care   Therapeutic Intervention:   Patient/family education  Dysphagia treatment   Patient continues to require skilled intervention:  Yes. Recommend ongoing speech therapy services during this hospitalization.     Anticipated Discharge Needs: Ongoing speech therapy is recommended at next level of care.      ASSESSMENT    Patient is not a safe candidate for po intake given current mentation.  Impaired acceptance with trials and talking while bolus remained in oral cavity. Immediate cough response following single tsp sip.  Additional trials deferred as he is not deemed safe as a po intake    Recommend NPO with non-oral nutrition/hydration/mediation.     GENERAL    Subjective: Patient awake. Talking, but non-sensical and unintelligible. Bilateral restraints in place.     History of Present Injury/Illness: Mr. Wong  has a past medical history of Cataracts, bilateral, Chest pain, CKD (chronic kidney disease) stage 3, GFR 30-59 ml/min ([facility]), Essential (primary) hypertension, and Seizure ([facility]).. He also  has a past surgical history that includes Colonoscopy () and [place] tooth extraction.  Precautions/Allergies: Patient has no known allergies.      Observations:  Alertness: Alert  Voice: low volume  Speech: Unintelligible  Expressive Language: No intelligible speech. Did not appear to directly answer any questions.   Receptive Language: No command following, no response to yes/no questions    Prior Dysphagia History: None  Prior Instrumental Assessment: None    Current Diet:  None    Respiratory Status: Nasal Cannula    Pain:  Patient does not appear in pain    OBJECTIVE      Limited oral motor assessment as patient cannot follow commands. Dry oral mucosa and lips. Oral care provided to moisten oral cavity and remove dried secretions. No gross asymmetries observed.   Ice chips applied to lips to assess appropriateness for po trials. Patient appropriately opened mouth and manipulated bolus in oral cavity. Timely swallow without overt s/sx of airway compromise. Slight improvement in speech after oral mucosa was moistened. Single tsp of water presented. Poor labial seal to retrieve bolus. Talking and inhaling prior to swallow which resulted in suspected aspiration. Immediate cough with facial redness, watery eyes, strong coughing. Additional trials deferred as mentation is a clear barrier to safe intake.     Dysphagia Outcome and Severity Scale (YANDY)  Score: 1 Description   [] 7 Normal in all situations   [] 6 Within Functional Limits/ Modified independent   [] 5 Mild Dysphagia: Distant Supervision. May need one diet consistency      restricted.   [] 4 Mild-Moderate Dysphagia: Intermittent supervision/cuing. One-two diet    consistencies restricted.   [] 3 Moderate Dysphagia: Total assistance, supervision, or strategies.       Two or more diet consistencies restricted.   [] 2 Moderate-Severe Dysphagia: Maximum assistance or maximum use     of strategies with partial po intake   [x] 1 Severe dysphagia- NPO. Unable to tolerate any po safely     PLAN    Duration/Frequency: Continue to follow patient 3x/week for duration of hospitalization and/or until goals

## 2024-01-04 NOTE — PROGRESS NOTES
END OF SHIFT NOTE:    INTAKE/OUTPUT  01/02 0701 - 01/03 0700  In: 3658.1 [I.V.:511.1]  Out: 250 [Urine:250]  Voiding: Yes  Catheter: No  Drain:   External Urinary Catheter (Active)   Site Assessment Clean,dry & intact 01/03/24 1554   Placement Repositioned 01/03/24 1554   Securement Method Securing device (Describe) 01/03/24 1554   Catheter Care Catheter/Wick replaced;Suction Canister/Tubing changed 01/03/24 0352   Perineal Care Yes 01/03/24 1554   Suction 40 mmgHg continuous 01/03/24 1554   Urine Color Yellow 01/03/24 1554   Urine Appearance Clear 01/03/24 1554   Output (mL) 600 mL 01/03/24 1540               Flatus: Patient does have flatus present.    Stool:  occurrences.    Characteristics:                Emesis:  occurrences.    Characteristics:        VITAL SIGNS  Patient Vitals for the past 12 hrs:   Temp Pulse Resp BP SpO2   01/03/24 1540 97.4 °F (36.3 °C) 62 16 (!) 169/88 100 %   01/03/24 1131 97.6 °F (36.4 °C) (!) 43 18 133/63 96 %   01/03/24 0751 98.5 °F (36.9 °C) (!) 46 17 (!) 156/70 98 %       Pain Assessment             Ambulating  No    Shift report given to oncoming nurse at the bedside.    Fatmata Jessica RN

## 2024-01-04 NOTE — PROGRESS NOTES
Hospitalist Progress Note   Admit Date:  2024  6:30 AM   Name:  Khadar Wong   Age:  88 y.o.  Sex:  male  :  1935   MRN:  [id number]   Room:      Presenting/Chief Complaint: weakness     Reason(s) for Admission: Altered mental state [R41.82]  Hypothermia, initial encounter [T68.XXXA]  Altered mental status, unspecified altered mental status type [R41.82]     Hospital Course:   Khadar Wong is a 88 y.o. male with medical history of hypertension, stenosis of anal canal who presented with altered mental status.  Unable to obtain any further history due to patient's current baseline status.  Patient was brought in from [facility] with weakness and confusion.    In the ER, patient was found to be hypothermic with a temperature of 94.6.  Labs were grossly unremarkable.  Patient did receive 1 dose of antibiotics placed on Maria Eugenia hugger and he was admitted for further workup.      Subjective & 24hr Events:   Patient was seen and evaluated at bedside this morning.  No significant change in patient mentation today.  Had a few episodes of bradycardia and hypothermia overnight.  Unable to answer questions and minimally verbal    Assessment & Plan:     Altered mental status  Hypothermia  Mental status still not improved this morning.  Infectious workup negative.  UA is bland.  Chest x-ray is clear.  Blood cultures negative to date.  Procalcitonin negative   TSH normal.  AM cortisol still pending as per lab.   Status post 1 dose antibiotics in ER  -Follow-up a.m. cortisol level  -Avoid sedating medications  -Will consider neurology consult if cortisol levels are normal  -If mentation does not improve, will need to possibly start NG tube feeds.      Bradycardia  Patient had a few episodes of bradycardia into the 40s overnight.  At that time, he was hypothermic which would make sense from a physiological standpoint.  Echo shows normal EF, no significant abnormalities.   -Monitor on    General:    Chronically ill in appearance  Head:  Normocephalic, atraumatic  Eyes:  Sclerae appear normal.  Pupils equally round.  ENT:  Nares appear normal.  Oral mucosa is dry, cracked lips noted  Neck:  No restricted ROM.  Trachea midline   CV:   Heart rate 60, regular..  No m/r/g.  No jugular venous distension.  Lungs:   CTAB.  No wheezing, rhonchi, or rales.  Symmetric expansion.  Abdomen:   Soft, nontender, nondistended.  Extremities: No cyanosis or clubbing.  No edema  Skin:     No rashes and normal coloration.   Warm and dry.    Neuro:  CN II-XII grossly intact.  Awake and alert but not oriented.  Psych:  Normal mood and affect.      I have personally reviewed labs and tests:  Recent Labs:  Recent Results (from the past 48 hour(s))   Procalcitonin    Collection Time: 01/03/24  4:13 AM   Result Value Ref Range    Procalcitonin <0.05 0.00 - 0.49 ng/mL   Basic Metabolic Panel w/ Reflex to MG    Collection Time: 01/03/24  4:14 AM   Result Value Ref Range    Sodium 137 136 - 146 mmol/L    Potassium 3.4 (L) 3.5 - 5.1 mmol/L    Chloride 109 103 - 113 mmol/L    CO2 23 21 - 32 mmol/L    Anion Gap 5 2 - 11 mmol/L    Glucose 86 65 - 100 mg/dL    BUN 17 8 - 23 MG/DL    Creatinine 1.00 0.8 - 1.5 MG/DL    Est, Glom Filt Rate >60 >60 ml/min/1.73m2    Calcium 8.5 8.3 - 10.4 MG/DL   CBC with Auto Differential    Collection Time: 01/03/24  4:14 AM   Result Value Ref Range    WBC 6.1 4.3 - 11.1 K/uL    RBC 3.47 (L) 4.23 - 5.6 M/uL    Hemoglobin 10.1 (L) 13.6 - 17.2 g/dL    Hematocrit 31.9 (L) 41.1 - 50.3 %    MCV 91.9 82 - 102 FL    MCH 29.1 26.1 - 32.9 PG    MCHC 31.7 31.4 - 35.0 g/dL    RDW 14.8 (H) 11.9 - 14.6 %    Platelets 211 150 - 450 K/uL    MPV 9.8 9.4 - 12.3 FL    nRBC 0.00 0.0 - 0.2 K/uL    Differential Type AUTOMATED      Neutrophils % 73 43 - 78 %    Lymphocytes % 14 13 - 44 %    Monocytes % 8 4.0 - 12.0 %    Eosinophils % 4 0.5 - 7.8 %    Basophils % 1 0.0 - 2.0 %    Immature Granulocytes 0 0.0 - 5.0 %     - 0.5 K/UL   Basic Metabolic Panel w/ Reflex to MG    Collection Time: 01/04/24  3:20 AM   Result Value Ref Range    Sodium 138 136 - 146 mmol/L    Potassium 3.7 3.5 - 5.1 mmol/L    Chloride 109 103 - 113 mmol/L    CO2 21 21 - 32 mmol/L    Anion Gap 8 2 - 11 mmol/L    Glucose 105 (H) 65 - 100 mg/dL    BUN 16 8 - 23 MG/DL    Creatinine 0.80 0.8 - 1.5 MG/DL    Est, Glom Filt Rate >60 >60 ml/min/1.73m2    Calcium 8.8 8.3 - 10.4 MG/DL   PLEASE READ & DOCUMENT PPD TEST IN 24 HRS    Collection Time: 01/04/24 11:41 AM   Result Value Ref Range    PPD, (POC) Negative     mm Induration 0 0 - 5 mm       Current Meds:  Current Facility-Administered Medications   Medication Dose Route Frequency    lactated ringers IV soln infusion   IntraVENous Continuous    amLODIPine (NORVASC) tablet 10 mg  10 mg Oral Daily    losartan (COZAAR) tablet 25 mg  25 mg Oral Daily    sodium chloride flush 0.9 % injection 5-40 mL  5-40 mL IntraVENous 2 times per day    sodium chloride flush 0.9 % injection 5-40 mL  5-40 mL IntraVENous PRN    0.9 % sodium chloride infusion   IntraVENous PRN    enoxaparin Sodium (LOVENOX) injection 30 mg  30 mg SubCUTAneous Q24H    ondansetron (ZOFRAN-ODT) disintegrating tablet 4 mg  4 mg Oral Q8H PRN    Or    ondansetron (ZOFRAN) injection 4 mg  4 mg IntraVENous Q6H PRN    polyethylene glycol (GLYCOLAX) packet 17 g  17 g Oral Daily PRN    acetaminophen (TYLENOL) tablet 650 mg  650 mg Oral Q6H PRN    Or    acetaminophen (TYLENOL) suppository 650 mg  650 mg Rectal Q6H PRN    guaiFENesin (MUCINEX) extended release tablet 1,200 mg  1,200 mg Oral BID    OLANZapine (ZyPREXA) 5 mg in sterile water 1 mL injection  5 mg IntraMUSCular Q12H PRN       Signed:  Owen Andrade MD    Part of this note may have been written by using a voice dictation software.  The note has been proof read but may still contain some grammatical/other typographical errors.

## 2024-01-04 NOTE — PROGRESS NOTES
Attempted to see patient this AM for occupational therapy evaluation session. Patient is in restraints and has been combative per nursing staff, will hold . Will follow and re-attempt as schedule permits/patient available. Thank you,    Linette Olivia, OT    Rehab Caseload Tracker

## 2024-01-04 NOTE — PROGRESS NOTES
0230: This RN rechecking VS. Attempted to check the temp and unable to obtain oral to axillary. Rectal temp: thermometer unable to register a core body temp. Patient placed on a mariia warmer. Hospitalist notified. Instructed to continue warming. New orders received for: STAT LA, Ammonia and all AM labs to be drawn.    0309: RN requested to hold fluids until core temp was attainable. Awaiting MD to respond.    0340: Patient rectal temp 90.2. Hospitalist notified. No new orders received. MD informed RN to contact the [place]. Charge RN notified misty. [place] and ICU charge RN are with a patient and unable to come to bedside at this time. This RN requested for hospitalist to come to bedside to assess the patient.     0355: Dr. Beltran arrived to bedside. IVF placed on hold.     0625: Temp obtained axillary: 98.8. Mariia warmer removed.

## 2024-01-05 ENCOUNTER — APPOINTMENT (OUTPATIENT)
Dept: GENERAL RADIOLOGY | Age: 89
End: 2024-01-05
Payer: MEDICARE

## 2024-01-05 ENCOUNTER — APPOINTMENT (OUTPATIENT)
Dept: MRI IMAGING | Age: 89
End: 2024-01-05
Payer: MEDICARE

## 2024-01-05 LAB
ANION GAP SERPL CALC-SCNC: 8 MMOL/L (ref 2–11)
BASOPHILS # BLD: 0 K/UL (ref 0–0.2)
BASOPHILS NFR BLD: 1 % (ref 0–2)
BUN SERPL-MCNC: 19 MG/DL (ref 8–23)
CALCIUM SERPL-MCNC: 8.7 MG/DL (ref 8.3–10.4)
CHLORIDE SERPL-SCNC: 111 MMOL/L (ref 103–113)
CO2 SERPL-SCNC: 23 MMOL/L (ref 21–32)
CORTIS 1H P CHAL SERPL-MCNC: 44 UG/DL
CORTIS 30M P CHAL SERPL-MCNC: 34.5 UG/DL
CORTIS BS SERPL-MCNC: 19.6 UG/DL
CREAT SERPL-MCNC: 1.2 MG/DL (ref 0.8–1.5)
DIFFERENTIAL METHOD BLD: ABNORMAL
EOSINOPHIL # BLD: 0.2 K/UL (ref 0–0.8)
EOSINOPHIL NFR BLD: 3 % (ref 0.5–7.8)
ERYTHROCYTE [DISTWIDTH] IN BLOOD BY AUTOMATED COUNT: 15.5 % (ref 11.9–14.6)
GLUCOSE SERPL-MCNC: 68 MG/DL (ref 65–100)
HCT VFR BLD AUTO: 33.3 % (ref 41.1–50.3)
HGB BLD-MCNC: 11 G/DL (ref 13.6–17.2)
IMM GRANULOCYTES # BLD AUTO: 0 K/UL (ref 0–0.5)
IMM GRANULOCYTES NFR BLD AUTO: 0 % (ref 0–5)
LYMPHOCYTES # BLD: 0.9 K/UL (ref 0.5–4.6)
LYMPHOCYTES NFR BLD: 14 % (ref 13–44)
MCH RBC QN AUTO: 29.6 PG (ref 26.1–32.9)
MCHC RBC AUTO-ENTMCNC: 33 G/DL (ref 31.4–35)
MCV RBC AUTO: 89.5 FL (ref 82–102)
MM INDURATION, POC: 0 MM (ref 0–5)
MONOCYTES # BLD: 0.5 K/UL (ref 0.1–1.3)
MONOCYTES NFR BLD: 7 % (ref 4–12)
NEUTS SEG # BLD: 4.9 K/UL (ref 1.7–8.2)
NEUTS SEG NFR BLD: 75 % (ref 43–78)
NRBC # BLD: 0 K/UL (ref 0–0.2)
PLATELET # BLD AUTO: 219 K/UL (ref 150–450)
PMV BLD AUTO: 9.4 FL (ref 9.4–12.3)
POTASSIUM SERPL-SCNC: 3.7 MMOL/L (ref 3.5–5.1)
PPD, POC: NEGATIVE
RBC # BLD AUTO: 3.72 M/UL (ref 4.23–5.6)
SODIUM SERPL-SCNC: 142 MMOL/L (ref 136–146)
WBC # BLD AUTO: 6.6 K/UL (ref 4.3–11.1)

## 2024-01-05 PROCEDURE — 2580000003 HC RX 258: Performed by: PSYCHIATRY & NEUROLOGY

## 2024-01-05 PROCEDURE — 95816 EEG AWAKE AND DROWSY: CPT

## 2024-01-05 PROCEDURE — 82024 ASSAY OF ACTH: CPT

## 2024-01-05 PROCEDURE — 74018 RADEX ABDOMEN 1 VIEW: CPT

## 2024-01-05 PROCEDURE — 80048 BASIC METABOLIC PNL TOTAL CA: CPT

## 2024-01-05 PROCEDURE — 82533 TOTAL CORTISOL: CPT

## 2024-01-05 PROCEDURE — 85025 COMPLETE CBC W/AUTO DIFF WBC: CPT

## 2024-01-05 PROCEDURE — 36415 COLL VENOUS BLD VENIPUNCTURE: CPT

## 2024-01-05 PROCEDURE — 6360000002 HC RX W HCPCS: Performed by: PSYCHIATRY & NEUROLOGY

## 2024-01-05 PROCEDURE — 2580000003 HC RX 258: Performed by: INTERNAL MEDICINE

## 2024-01-05 PROCEDURE — 95819 EEG AWAKE AND ASLEEP: CPT | Performed by: PSYCHIATRY & NEUROLOGY

## 2024-01-05 PROCEDURE — 2580000003 HC RX 258: Performed by: STUDENT IN AN ORGANIZED HEALTH CARE EDUCATION/TRAINING PROGRAM

## 2024-01-05 PROCEDURE — A4216 STERILE WATER/SALINE, 10 ML: HCPCS | Performed by: PSYCHIATRY & NEUROLOGY

## 2024-01-05 PROCEDURE — 6360000002 HC RX W HCPCS: Performed by: STUDENT IN AN ORGANIZED HEALTH CARE EDUCATION/TRAINING PROGRAM

## 2024-01-05 PROCEDURE — 1100000003 HC PRIVATE W/ TELEMETRY

## 2024-01-05 PROCEDURE — 6360000002 HC RX W HCPCS: Performed by: INTERNAL MEDICINE

## 2024-01-05 RX ORDER — DEXTROSE AND SODIUM CHLORIDE 5; .9 G/100ML; G/100ML
INJECTION, SOLUTION INTRAVENOUS CONTINUOUS
Status: DISCONTINUED | OUTPATIENT
Start: 2024-01-05 | End: 2024-01-07

## 2024-01-05 RX ORDER — HYDRALAZINE HYDROCHLORIDE 20 MG/ML
10 INJECTION INTRAMUSCULAR; INTRAVENOUS EVERY 4 HOURS PRN
Status: DISCONTINUED | OUTPATIENT
Start: 2024-01-05 | End: 2024-01-11 | Stop reason: HOSPADM

## 2024-01-05 RX ADMIN — SODIUM CHLORIDE, POTASSIUM CHLORIDE, SODIUM LACTATE AND CALCIUM CHLORIDE: 600; 310; 30; 20 INJECTION, SOLUTION INTRAVENOUS at 03:21

## 2024-01-05 RX ADMIN — SODIUM CHLORIDE 1 MG: 9 INJECTION INTRAMUSCULAR; INTRAVENOUS; SUBCUTANEOUS at 15:29

## 2024-01-05 RX ADMIN — SODIUM CHLORIDE, POTASSIUM CHLORIDE, SODIUM LACTATE AND CALCIUM CHLORIDE: 600; 310; 30; 20 INJECTION, SOLUTION INTRAVENOUS at 13:24

## 2024-01-05 RX ADMIN — DEXTROSE AND SODIUM CHLORIDE: 5; 900 INJECTION, SOLUTION INTRAVENOUS at 17:51

## 2024-01-05 RX ADMIN — ENOXAPARIN SODIUM 30 MG: 100 INJECTION SUBCUTANEOUS at 21:55

## 2024-01-05 RX ADMIN — SODIUM CHLORIDE, PRESERVATIVE FREE 10 ML: 5 INJECTION INTRAVENOUS at 21:55

## 2024-01-05 RX ADMIN — COSYNTROPIN 250 MCG: 0.25 INJECTION, POWDER, LYOPHILIZED, FOR SOLUTION INTRAVENOUS at 08:00

## 2024-01-05 RX ADMIN — HYDRALAZINE HYDROCHLORIDE 10 MG: 20 INJECTION, SOLUTION INTRAMUSCULAR; INTRAVENOUS at 09:08

## 2024-01-05 RX ADMIN — SODIUM CHLORIDE, PRESERVATIVE FREE 10 ML: 5 INJECTION INTRAVENOUS at 09:14

## 2024-01-05 NOTE — PROCEDURES
Routine EEG Report    Reason for EEG: AMS    Scheduled Meds:   amLODIPine  10 mg Oral Daily    losartan  25 mg Oral Daily    sodium chloride flush  5-40 mL IntraVENous 2 times per day    enoxaparin  30 mg SubCUTAneous Q24H    guaiFENesin  1,200 mg Oral BID     Continuous Infusions:   lactated ringers IV soln 100 mL/hr at 01/05/24 0322    sodium chloride       PRN Meds:.hydrALAZINE, sodium chloride flush, sodium chloride, ondansetron **OR** ondansetron, polyethylene glycol, acetaminophen **OR** acetaminophen, OLANZapine (ZyPREXA) 5 mg in sterile water 1 mL injection      Technical Summary: This EEG was performed with a 32-channel digital EEG machine with electrodes placed according to the international 10-20 system of placement.            Background:   During the maximal awake state no posterior dominant rhythm was seen.  Anterior background consisted of medium to low amplitude activity in the primarily in the delta range with occasional intermixed faster frequencies.The are occasional triphasic waves with anterior to posterior lag.     Hyperventilation: Hyperventilation was not performed due to medical condition    Photic Stimulation: Photic stimulation was not performed due to medical condition    Sleep: None    Impression: The results of this EEG are abnormal.  There is slowing of the background consistent with a severe degree of encephalopathy, nonspecific in origin. Triphasic waves are present which can be seen in metabolic encephalopathy. There are no lateralizing features or epileptiform discharges.

## 2024-01-05 NOTE — CONSULTS
Clinical Ethics Consultation Note    History and Context:  Khadar Wong is a 88 y.o. male with medical history of hypertension, stenosis of anal canal who presented with altered mental status.  Unable to obtain any further history due to patient's current baseline status.  Patient was brought in from [facility] with weakness and confusion.     Due to altered mental status, patient does not have decision making capacity. Records do not indicate any family members for the patient.    Principal Problem:    Altered mental state  Active Problems:    Essential (primary) hypertension  Resolved Problems:    * No resolved hospital problems. *    Age: 88 y.o.  Gender: male  Gender Identity: male  Admitted Date: 1/2/2024  Consult Date: 01/05/24  MRN: [id number]  Valid Advanced Medical Directive: No    Process:  Ethics was consulted by attending provider Owen Andrade MD.     This consultant reached out to the legal department for assistance locating any next of kin through a [employer] search. The legal department was able to locate a potential son and potential granddaughter. Case management then reached out to the granddaughter.    Ethical Question(s) and Concern(s):  Who is the appropriate legal healthcare decision maker for this patient    Analysis:  Per Case Mgmt note, Lisa is patient's granddaughter. Lisa states that Riley is patient's son who is currently on house arrest in a homeless shelter in [place]. RNCM asked Lisa if she would be willing to make medical decisions for patient, she would like to have time to think about it because she has been estranged from him for years. Lisa will try to get in contact with patient's son, Riley. Lisa states that Riley is unable to make good decisions for himself.     Recommendations:  CM follow up with Lisa next week to see if she has made a decision regarding acting as surrogate decision maker.     In the meantime, emergent care may be

## 2024-01-05 NOTE — PROGRESS NOTES
Physical Therapy Note:    Attempted to see patient this AM for physical therapy evaluation session. Patient unable to participate given impaired mentation and alertness. . Will discontinue skilled PT.  Please re-consult when/if appropriate. Thank you,    YONG Laws, PT     Rehab Caseload Tracker

## 2024-01-05 NOTE — PROGRESS NOTES
Hospitalist Progress Note   Admit Date:  2024  6:30 AM   Name:  Khadar Wong   Age:  88 y.o.  Sex:  male  :  1935   MRN:  [id number]   Room:      Presenting/Chief Complaint: weakness     Reason(s) for Admission: Altered mental state [R41.82]  Hypothermia, initial encounter [T68.XXXA]  Altered mental status, unspecified altered mental status type [R41.82]     Hospital Course:   Khadar Wong is a 88 y.o. male with medical history of hypertension, stenosis of anal canal who presented with altered mental status.  Unable to obtain any further history due to patient's current baseline status.  Patient was brought in from [facility] with weakness and confusion.    In the ER, patient was found to be hypothermic with a temperature of 94.6.  Labs were grossly unremarkable.  Patient did receive 1 dose of antibiotics placed on Maria Eugenia hugger and he was admitted for further workup.      Subjective & 24hr Events:   Patient was seen and evaluated at bedside this morning.  Patient is to remain telepathic.  Unable to follow commands.  As per nursing staff, possible jerking-like activity noted.  Patient has history of seizures documented in medical record.    Assessment & Plan:     Altered mental status  Hypothermia  Patient remains encephalopathic.  Neurology consultation noted.  MRI has been ordered.  EEG was abnormal but no overt seizure-like activity.  Infectious workup negative.  UA is bland.  Chest x-ray is clear.  Blood cultures negative to date.  Procalcitonin negative.  TSH normal.  ACTH stimulation test performed this morning, awaiting results.  Unable to safely swallow, no PO intake in last 3 days.  Will start on D5NS for support.    -Follow-up cosyntropin test  -Start D5 NS  -Avoid sedating medications  - Follow-up neurology recs -MRI brain  -If mentation does not improve, will need to possibly start NG tube feeds.    -Follow-up ethics consult -no known NOK.  Likely needs NG tube    Result Value Ref Range    WBC 5.5 4.3 - 11.1 K/uL    RBC 3.62 (L) 4.23 - 5.6 M/uL    Hemoglobin 10.5 (L) 13.6 - 17.2 g/dL    Hematocrit 32.5 (L) 41.1 - 50.3 %    MCV 89.8 82 - 102 FL    MCH 29.0 26.1 - 32.9 PG    MCHC 32.3 31.4 - 35.0 g/dL    RDW 14.6 11.9 - 14.6 %    Platelets 168 150 - 450 K/uL    MPV 9.6 9.4 - 12.3 FL    nRBC 0.00 0.0 - 0.2 K/uL    Differential Type AUTOMATED      Neutrophils % 88 (H) 43 - 78 %    Lymphocytes % 6 (L) 13 - 44 %    Monocytes % 4 4.0 - 12.0 %    Eosinophils % 2 0.5 - 7.8 %    Basophils % 0 0.0 - 2.0 %    Immature Granulocytes 0 0.0 - 5.0 %    Neutrophils Absolute 4.8 1.7 - 8.2 K/UL    Lymphocytes Absolute 0.3 (L) 0.5 - 4.6 K/UL    Monocytes Absolute 0.2 0.1 - 1.3 K/UL    Eosinophils Absolute 0.1 0.0 - 0.8 K/UL    Basophils Absolute 0.0 0.0 - 0.2 K/UL    Absolute Immature Granulocyte 0.0 0.0 - 0.5 K/UL   Basic Metabolic Panel w/ Reflex to MG    Collection Time: 01/04/24  3:20 AM   Result Value Ref Range    Sodium 138 136 - 146 mmol/L    Potassium 3.7 3.5 - 5.1 mmol/L    Chloride 109 103 - 113 mmol/L    CO2 21 21 - 32 mmol/L    Anion Gap 8 2 - 11 mmol/L    Glucose 105 (H) 65 - 100 mg/dL    BUN 16 8 - 23 MG/DL    Creatinine 0.80 0.8 - 1.5 MG/DL    Est, Glom Filt Rate >60 >60 ml/min/1.73m2    Calcium 8.8 8.3 - 10.4 MG/DL   PLEASE READ & DOCUMENT PPD TEST IN 24 HRS    Collection Time: 01/04/24 11:41 AM   Result Value Ref Range    PPD, (POC) Negative     mm Induration 0 0 - 5 mm   CBC with Auto Differential    Collection Time: 01/05/24  7:05 AM   Result Value Ref Range    WBC 6.6 4.3 - 11.1 K/uL    RBC 3.72 (L) 4.23 - 5.6 M/uL    Hemoglobin 11.0 (L) 13.6 - 17.2 g/dL    Hematocrit 33.3 (L) 41.1 - 50.3 %    MCV 89.5 82 - 102 FL    MCH 29.6 26.1 - 32.9 PG    MCHC 33.0 31.4 - 35.0 g/dL    RDW 15.5 (H) 11.9 - 14.6 %    Platelets 219 150 - 450 K/uL    MPV 9.4 9.4 - 12.3 FL    nRBC 0.00 0.0 - 0.2 K/uL    Differential Type AUTOMATED      Neutrophils % 75 43 - 78 %    Lymphocytes % 14 13 - 44 %     Monocytes % 7 4.0 - 12.0 %    Eosinophils % 3 0.5 - 7.8 %    Basophils % 1 0.0 - 2.0 %    Immature Granulocytes 0 0.0 - 5.0 %    Neutrophils Absolute 4.9 1.7 - 8.2 K/UL    Lymphocytes Absolute 0.9 0.5 - 4.6 K/UL    Monocytes Absolute 0.5 0.1 - 1.3 K/UL    Eosinophils Absolute 0.2 0.0 - 0.8 K/UL    Basophils Absolute 0.0 0.0 - 0.2 K/UL    Absolute Immature Granulocyte 0.0 0.0 - 0.5 K/UL   Basic Metabolic Panel w/ Reflex to MG    Collection Time: 01/05/24  7:05 AM   Result Value Ref Range    Sodium 142 136 - 146 mmol/L    Potassium 3.7 3.5 - 5.1 mmol/L    Chloride 111 103 - 113 mmol/L    CO2 23 21 - 32 mmol/L    Anion Gap 8 2 - 11 mmol/L    Glucose 68 65 - 100 mg/dL    BUN 19 8 - 23 MG/DL    Creatinine 1.20 0.8 - 1.5 MG/DL    Est, Glom Filt Rate 59 (L) >60 ml/min/1.73m2    Calcium 8.7 8.3 - 10.4 MG/DL   Cortisol, Baseline    Collection Time: 01/05/24  7:05 AM   Result Value Ref Range    Cortisol 19.6 ug/dL   Cortisol 30 Min, Total    Collection Time: 01/05/24  8:35 AM   Result Value Ref Range    Cortisol, 30 Min 34.5 ug/dL   Cortisol 60 Min, Total    Collection Time: 01/05/24  9:09 AM   Result Value Ref Range    Cortisol, 60 Min 44.0 ug/dL   PLEASE READ & DOCUMENT PPD TEST IN 48 HRS    Collection Time: 01/05/24 11:41 AM   Result Value Ref Range    PPD, (POC) Negative     mm Induration 0 0 - 5 mm       Current Meds:  Current Facility-Administered Medications   Medication Dose Route Frequency    hydrALAZINE (APRESOLINE) injection 10 mg  10 mg IntraVENous Q4H PRN    LORazepam (ATIVAN) 1 mg in sodium chloride (PF) 0.9 % 10 mL injection  1 mg IntraVENous Once    lactated ringers IV soln infusion   IntraVENous Continuous    amLODIPine (NORVASC) tablet 10 mg  10 mg Oral Daily    losartan (COZAAR) tablet 25 mg  25 mg Oral Daily    sodium chloride flush 0.9 % injection 5-40 mL  5-40 mL IntraVENous 2 times per day    sodium chloride flush 0.9 % injection 5-40 mL  5-40 mL IntraVENous PRN    0.9 % sodium chloride infusion

## 2024-01-05 NOTE — CONSULTS
Consult    Patient: Khadar Wong MRN: [id number]     YOB: 1935  Age: 88 y.o.  Sex: male      Subjective:      Khadar Wong is a 88 y.o. male who is being seen for altered mental status.    The patient was brought to the hospital from [facility] with generalized weakness and confusion.  He was hypothermic in the emergency department and a bear hugger was applied.    The patient remains severely encephalopathic and cannot contribute to his history.  It is mentioned in his chart that he has a history of seizures.  History is very limited    Past Medical History:   Diagnosis Date    Cataracts, bilateral     Chest pain     negative nuclear stress test    CKD (chronic kidney disease) stage 3, GFR 30-59 ml/min (HCC)     Essential (primary) hypertension     Seizure (HCC)      Past Surgical History:   Procedure Laterality Date    COLONOSCOPY      WISDOM TOOTH EXTRACTION        Family History   Problem Relation Age of Onset    Cancer Father         ?     Social History     Tobacco Use    Smoking status: Former     Current packs/day: 0.00     Types: Cigarettes     Quit date: 1959     Years since quittin.0    Smokeless tobacco: Never   Substance Use Topics    Alcohol use: No     Alcohol/week: 0.0 standard drinks of alcohol      Current Facility-Administered Medications   Medication Dose Route Frequency Provider Last Rate Last Admin    hydrALAZINE (APRESOLINE) injection 10 mg  10 mg IntraVENous Q4H PRN Owen Andrade MD   10 mg at 24 0908    lactated ringers IV soln infusion   IntraVENous Continuous Owen Andrade  mL/hr at 24 0322 Rate Verify at 24 0322    amLODIPine (NORVASC) tablet 10 mg  10 mg Oral Daily Connie Cyr MD        losartan (COZAAR) tablet 25 mg  25 mg Oral Daily Connie Cyr MD        sodium chloride flush 0.9 % injection 5-40 mL  5-40 mL IntraVENous 2 times per day Connie Cyr MD   10 mL at 24 0914     sodium chloride flush 0.9 % injection 5-40 mL  5-40 mL IntraVENous PRN Connie Cyr MD        0.9 % sodium chloride infusion   IntraVENous PRN Connie Cyr MD        enoxaparin Sodium (LOVENOX) injection 30 mg  30 mg SubCUTAneous Q24H Connie Cyr MD   30 mg at 01/04/24 2130    ondansetron (ZOFRAN-ODT) disintegrating tablet 4 mg  4 mg Oral Q8H PRN Connie Cyr MD        Or    ondansetron (ZOFRAN) injection 4 mg  4 mg IntraVENous Q6H PRN Connie Cyr MD        polyethylene glycol (GLYCOLAX) packet 17 g  17 g Oral Daily PRN Connie Cyr MD        acetaminophen (TYLENOL) tablet 650 mg  650 mg Oral Q6H PRN Connie Cyr MD        Or    acetaminophen (TYLENOL) suppository 650 mg  650 mg Rectal Q6H PRN Connie Cyr MD        guaiFENesin (MUCINEX) extended release tablet 1,200 mg  1,200 mg Oral BID Connie Cyr MD        OLANZapine (ZyPREXA) 5 mg in sterile water 1 mL injection  5 mg IntraMUSCular Q12H PRN Mali Grace APRN - CNP   5 mg at 01/03/24 1254        No Known Allergies    Review of Systems: Could not obtain due to altered mental status        Objective:     Vitals:    01/05/24 0330 01/05/24 0847 01/05/24 0850 01/05/24 1136   BP:  (!) 141/107 (!) 147/105 (!) 153/70   Pulse: 79 79 87 77   Resp:  16  16   Temp:  97.5 °F (36.4 °C)  98.8 °F (37.1 °C)   TempSrc:  Axillary  Axillary   SpO2: 100% 99%  95%   Weight:            Physical Exam:   General -: Restless and agitated.  Does not follow commands  HEENT - Normocephalic, atraumatic. Conjunctiva, tympanic membranes, and oropharynx are clear.   Neck - Supple without masses, no bruits   Cardiovascular - Regular rate and rhythm. Normal S1, S2 without murmurs, rubs, or gallops.  Lungs - Clear to auscultation.  Abdomen - Soft, nontender with normal bowel sounds.   Extremities - Peripheral pulses intact. No edema and no rashes.     Neurological examination -    Comprehension and attention are

## 2024-01-05 NOTE — PROGRESS NOTES
END OF SHIFT NOTE:    INTAKE/OUTPUT  01/03 0701 - 01/04 0700  In: 1610.2 [I.V.:1610.2]  Out: 1550 [Urine:1550]  Voiding: Yes  Catheter: No (external cath)  Drain:   External Urinary Catheter (Active)   Site Assessment Clean,dry & intact 01/04/24 0800   Placement Replaced 01/04/24 0843   Securement Method Securing device (Describe) 01/03/24 1554   Catheter Care Catheter/Wick replaced 01/04/24 0843   Perineal Care Yes 01/04/24 0843   Suction 40 mmgHg continuous 01/04/24 0800   Urine Color Yellow 01/04/24 0800   Urine Appearance Clear 01/04/24 0800   Output (mL) 250 mL 01/04/24 1542               Flatus: Patient does have flatus present.    Stool:  2 occurrences.    Characteristics:           Stool Assessment  Last BM (including prior to admit): 01/04/24    Emesis:  occurrences.    Characteristics:        VITAL SIGNS  Patient Vitals for the past 12 hrs:   Temp Pulse Resp BP SpO2   01/04/24 1542 98.1 °F (36.7 °C) 78 -- (!) 155/67 96 %   01/04/24 1239 98.6 °F (37 °C) 90 18 122/74 96 %   01/04/24 1106 98.3 °F (36.8 °C) -- -- -- --   01/04/24 0946 97.8 °F (36.6 °C) -- -- -- --   01/04/24 0943 -- 85 -- -- --   01/04/24 0845 -- 86 -- (!) 131/51 --   01/04/24 0843 (!) 96.4 °F (35.8 °C) (!) 138 -- (!) 118/98 96 %       Pain Assessment             Ambulating  No    Shift report given to oncoming nurse at the bedside.    Josephine Evans RN

## 2024-01-05 NOTE — PLAN OF CARE
Problem: Safety - Adult  Goal: Free from fall injury  Outcome: Progressing  Flowsheets (Taken 1/5/2024 0721)  Free From Fall Injury: Instruct family/caregiver on patient safety     Problem: Pain  Goal: Verbalizes/displays adequate comfort level or baseline comfort level  Outcome: Progressing     Problem: Skin/Tissue Integrity  Goal: Absence of new skin breakdown  Description: 1.  Monitor for areas of redness and/or skin breakdown  2.  Assess vascular access sites hourly  3.  Every 4-6 hours minimum:  Change oxygen saturation probe site  4.  Every 4-6 hours:  If on nasal continuous positive airway pressure, respiratory therapy assess nares and determine need for appliance change or resting period.  Outcome: Progressing     Problem: ABCDS Injury Assessment  Goal: Absence of physical injury  Outcome: Progressing     Problem: Discharge Planning  Goal: Discharge to home or other facility with appropriate resources  Outcome: Not Progressing     Problem: Safety - Medical Restraint  Goal: Remains free of injury from restraints (Restraint for Interference with Medical Device)  Description: INTERVENTIONS:  1. Determine that other, less restrictive measures have been tried or would not be effective before applying the restraint  2. Evaluate the patient's condition at the time of restraint application  3. Inform patient/family regarding the reason for restraint  4. Q2H: Monitor safety, psychosocial status, comfort, nutrition and hydration  Outcome: Not Progressing  Flowsheets  Taken 1/5/2024 0850  Remains free of injury from restraints (restraint for interference with medical device):   Determine that other, less restrictive measures have been tried or would not be effective before applying the restraint   Evaluate the patient's condition at the time of restraint application   Inform patient/family regarding the reason for restraint   Every 2 hours: Monitor safety, psychosocial status, comfort, nutrition and hydration  Taken  1/5/2024 0811  Remains free of injury from restraints (restraint for interference with medical device):   Evaluate the patient's condition at the time of restraint application   Determine that other, less restrictive measures have been tried or would not be effective before applying the restraint   Inform patient/family regarding the reason for restraint   Every 2 hours: Monitor safety, psychosocial status, comfort, nutrition and hydration  Taken 1/5/2024 0721  Remains free of injury from restraints (restraint for interference with medical device):   Determine that other, less restrictive measures have been tried or would not be effective before applying the restraint   Inform patient/family regarding the reason for restraint   Evaluate the patient's condition at the time of restraint application   Every 2 hours: Monitor safety, psychosocial status, comfort, nutrition and hydration  Taken 1/5/2024 0700  Remains free of injury from restraints (restraint for interference with medical device):   Determine that other, less restrictive measures have been tried or would not be effective before applying the restraint   Evaluate the patient's condition at the time of restraint application   Inform patient/family regarding the reason for restraint   Every 2 hours: Monitor safety, psychosocial status, comfort, nutrition and hydration

## 2024-01-05 NOTE — PROGRESS NOTES
Please complete and sign MRI screening form.If applicable please have medication for claustrophobia ready.

## 2024-01-05 NOTE — PROGRESS NOTES
Occupational Therapy Note:    Attempted to see patient this AM for occupational therapy evaluation session. Patient unable to participate given impaired mentation and alertness. Will discontinue skilled OT.  Please re-consult when/if appropriate. Thank you,    Rayna Harris, OT     Rehab Caseload Tracker

## 2024-01-05 NOTE — PROGRESS NOTES
This nurse observed patient during the night multiple times tense body (arms/legs)and shake repeatedly (lasting a few seconds) similar to seizure like activity. Arms are restrained d/t safety concerns and confusion therefore difficult to determine if patient is convulsing or pulling to resist restraints. This nurse reviewed care everywhere and noted hx of seizure and convulsions tx in the past with gabapentin. Nurse will relay findings and concern to day nurse for hospitalist to evaluate.     Suction canister at bedside +coughing and garbling of secretions noted.

## 2024-01-05 NOTE — PROGRESS NOTES
SPEECH PATHOLOGY NOTE    Speech therapy attempt this AM. Patient unable to participate given impaired mentation and alertness. Will defer all trials today. May need to consider alternative nutrition/hydration/medication in the near future if overall status does not significantly improve. Will continue to follow.     LELA Garibay, CCC-SLP  Speech Language Pathologist  Acute Rehabilitation Services  Contact: Slim

## 2024-01-05 NOTE — CARE COORDINATION
RNCM: Ethics consulted for assistance with locating family for Patient. RNCM given phone numbers for Riley Wong (possible son) and Lisa Wong (possible daughter). Phone number for Riley Wong was an invalid number. Phone number for Lisa Wong ([phone]) was valid, HIPAA compliant voicemail left for return call. CM following.    Lisa is patient's granddaughter. Lisa states that Riley is patient's son who is currently on house arrest in a homeless shelter in [place]. RNCM asked Lisa if she would be willing to make medical decisions for patient, she would like to have time to think about it because she has been estranged from him for years. Lisa will try to get in contact with patient's son, Riley. Lisa states that Riley is unable to make good decisions for himself. CM following.

## 2024-01-05 NOTE — CARE COORDINATION
Chart reviewed by RNCM and discussed in IDR.    CM following for continued stay. Due to patient’s current medical status, discharge needs are unclear at this time. PT/OT evals pending. CM following.

## 2024-01-06 ENCOUNTER — APPOINTMENT (OUTPATIENT)
Dept: MRI IMAGING | Age: 89
End: 2024-01-06
Payer: MEDICARE

## 2024-01-06 LAB
ANION GAP SERPL CALC-SCNC: 7 MMOL/L (ref 2–11)
BASOPHILS # BLD: 0 K/UL (ref 0–0.2)
BASOPHILS NFR BLD: 0 % (ref 0–2)
BUN SERPL-MCNC: 18 MG/DL (ref 8–23)
CALCIUM SERPL-MCNC: 8.3 MG/DL (ref 8.3–10.4)
CHLORIDE SERPL-SCNC: 114 MMOL/L (ref 103–113)
CO2 SERPL-SCNC: 25 MMOL/L (ref 21–32)
CREAT SERPL-MCNC: 1.2 MG/DL (ref 0.8–1.5)
DIFFERENTIAL METHOD BLD: ABNORMAL
EOSINOPHIL # BLD: 0.1 K/UL (ref 0–0.8)
EOSINOPHIL NFR BLD: 2 % (ref 0.5–7.8)
ERYTHROCYTE [DISTWIDTH] IN BLOOD BY AUTOMATED COUNT: 15.3 % (ref 11.9–14.6)
GLUCOSE SERPL-MCNC: 103 MG/DL (ref 65–100)
HCT VFR BLD AUTO: 33.6 % (ref 41.1–50.3)
HGB BLD-MCNC: 10.6 G/DL (ref 13.6–17.2)
IMM GRANULOCYTES # BLD AUTO: 0 K/UL (ref 0–0.5)
IMM GRANULOCYTES NFR BLD AUTO: 0 % (ref 0–5)
LYMPHOCYTES # BLD: 1 K/UL (ref 0.5–4.6)
LYMPHOCYTES NFR BLD: 18 % (ref 13–44)
MAGNESIUM SERPL-MCNC: 2.1 MG/DL (ref 1.8–2.4)
MCH RBC QN AUTO: 29.4 PG (ref 26.1–32.9)
MCHC RBC AUTO-ENTMCNC: 31.5 G/DL (ref 31.4–35)
MCV RBC AUTO: 93.3 FL (ref 82–102)
MONOCYTES # BLD: 0.4 K/UL (ref 0.1–1.3)
MONOCYTES NFR BLD: 7 % (ref 4–12)
NEUTS SEG # BLD: 3.9 K/UL (ref 1.7–8.2)
NEUTS SEG NFR BLD: 73 % (ref 43–78)
NRBC # BLD: 0 K/UL (ref 0–0.2)
PLATELET # BLD AUTO: 214 K/UL (ref 150–450)
PMV BLD AUTO: 10 FL (ref 9.4–12.3)
POTASSIUM SERPL-SCNC: 3.5 MMOL/L (ref 3.5–5.1)
RBC # BLD AUTO: 3.6 M/UL (ref 4.23–5.6)
SODIUM SERPL-SCNC: 146 MMOL/L (ref 136–146)
WBC # BLD AUTO: 5.5 K/UL (ref 4.3–11.1)

## 2024-01-06 PROCEDURE — 1100000003 HC PRIVATE W/ TELEMETRY

## 2024-01-06 PROCEDURE — 70551 MRI BRAIN STEM W/O DYE: CPT

## 2024-01-06 PROCEDURE — 2580000003 HC RX 258: Performed by: INTERNAL MEDICINE

## 2024-01-06 PROCEDURE — 36415 COLL VENOUS BLD VENIPUNCTURE: CPT

## 2024-01-06 PROCEDURE — 6360000002 HC RX W HCPCS: Performed by: INTERNAL MEDICINE

## 2024-01-06 PROCEDURE — 85025 COMPLETE CBC W/AUTO DIFF WBC: CPT

## 2024-01-06 PROCEDURE — 2580000003 HC RX 258: Performed by: STUDENT IN AN ORGANIZED HEALTH CARE EDUCATION/TRAINING PROGRAM

## 2024-01-06 PROCEDURE — 99232 SBSQ HOSP IP/OBS MODERATE 35: CPT | Performed by: PSYCHIATRY & NEUROLOGY

## 2024-01-06 PROCEDURE — 83735 ASSAY OF MAGNESIUM: CPT

## 2024-01-06 PROCEDURE — 80048 BASIC METABOLIC PNL TOTAL CA: CPT

## 2024-01-06 RX ORDER — ENOXAPARIN SODIUM 100 MG/ML
40 INJECTION SUBCUTANEOUS EVERY 24 HOURS
Status: DISCONTINUED | OUTPATIENT
Start: 2024-01-06 | End: 2024-01-11 | Stop reason: HOSPADM

## 2024-01-06 RX ADMIN — DEXTROSE AND SODIUM CHLORIDE: 5; 900 INJECTION, SOLUTION INTRAVENOUS at 07:17

## 2024-01-06 RX ADMIN — HYDRALAZINE HYDROCHLORIDE 10 MG: 20 INJECTION, SOLUTION INTRAMUSCULAR; INTRAVENOUS at 15:32

## 2024-01-06 RX ADMIN — SODIUM CHLORIDE, PRESERVATIVE FREE 10 ML: 5 INJECTION INTRAVENOUS at 22:36

## 2024-01-06 RX ADMIN — DEXTROSE AND SODIUM CHLORIDE: 5; 900 INJECTION, SOLUTION INTRAVENOUS at 22:58

## 2024-01-06 RX ADMIN — ENOXAPARIN SODIUM 40 MG: 100 INJECTION SUBCUTANEOUS at 22:35

## 2024-01-06 RX ADMIN — SODIUM CHLORIDE, PRESERVATIVE FREE 10 ML: 5 INJECTION INTRAVENOUS at 08:53

## 2024-01-06 ASSESSMENT — PAIN SCALES - GENERAL: PAINLEVEL_OUTOF10: 0

## 2024-01-06 NOTE — PROGRESS NOTES
Hospitalist Progress Note   Admit Date:  2024  6:30 AM   Name:  Khadar Wong   Age:  88 y.o.  Sex:  male  :  1935   MRN:  [id number]   Room:      Presenting/Chief Complaint: weakness     Reason(s) for Admission: Altered mental state [R41.82]  Hypothermia, initial encounter [T68.XXXA]  Altered mental status, unspecified altered mental status type [R41.82]     Hospital Course:   Khadar Wong is a 88 y.o. male with medical history of hypertension, stenosis of anal canal who presented with altered mental status.  Unable to obtain any further history due to patient's current baseline status.  Patient was brought in from [facility] with weakness and confusion.    In the ER, patient was found to be hypothermic with a temperature of 94.6.  Labs were grossly unremarkable.  Patient did receive 1 dose of antibiotics placed on Maria Eugenia hugger and he was admitted for further workup.      Subjective & 24hr Events:   Patient was seen and evaluated at bedside this morning.  Patient appears to be improved.  Able to speak but appears confused.  Follows commands.    Assessment & Plan:     Altered mental status  Hypothermia  Improved but still encephalopathic.  MRI was completed, pending read.  EEG was abnormal but no overt seizure-like activity.  Infectious workup negative.  UA is bland.  Chest x-ray is clear.  Blood cultures negative to date.  Procalcitonin negative.  TSH normal.  ACTH stimulation test shows normal adrenal function.  Cortisol a.m. on day of testing was normal.  No evidence of adrenal insufficiency at this time.  -Continue with D5 NS  -Avoid sedating medications  - Follow-up neurology recs -MRI brain completed, pending read  -If mentation does not improve, will need to possibly start NG tube feeds.    -Follow-up ethics consult -next of kin found but estranged.     Bradycardia - resolved.   No further bradycardic events noted.  Patient has been normothermic.  -Monitor on

## 2024-01-06 NOTE — PROGRESS NOTES
Neurology Progress Note       Interval History: He remains encephalopathic with mittens on.  Metabolic workup is still underway by the primary team.  Today, he is showing signs of improvement.  He is able to mumble his name and follow some simple commands.      Examination: Pertinent positives and negatives include:    Vitals:    01/05/24 2028 01/05/24 2318 01/06/24 0336 01/06/24 0804   BP: (!) 145/62 (!) 146/88 (!) 132/93 130/77   Pulse: 64 68 75 70   Resp: 17 17 17 18   Temp: 97.5 °F (36.4 °C) 97.5 °F (36.4 °C) 98.2 °F (36.8 °C) 97.2 °F (36.2 °C)   TempSrc: Axillary Oral Axillary Oral   SpO2: 94% 92% 92% 92%   Weight:             Somnolent.  Oriented to person.  Speech is dysarthric.  He follows some basic commands.  He moves all 4 limbs against gravity.  Pupils are equal, round, and reactive to light.  Face is symmetric.  Muscle stretch reflexes are diminished throughout.  Toes are now downgoing to plantar stimulation.      Assessment and Plan: 88-year-old man with altered mental status of unknown etiology, possibly metabolic or toxic.  He is starting to show signs of improvement.    Recommendations  MRI of the brain    Management of Delirium     Non-pharmacological intervention  Reorient the patient throughout the day  Window open and lights on during the day. Lights off, television off, noises down during the night. If able, decrease nursing checks at night  Therapies as often as possible  Avoid restraints to the best of your ability   Avoid sensory deprivation by using glasses and hearing aids, if applicable       Pharmacological intervention  Replete electrolyte abnormalities and correct metabolic abnormalities  Limit benzodiazepines, antihistamines, narcotics, anticholinergics. Preference towards Precedex for sedation over fentanyl and benzodiazepines/GABAa agonists.   For dangerous behavior/aggression to self or others can consider Zyprexa or Seroquel if benefits outweigh risk  For persistent insomnia can

## 2024-01-06 NOTE — PROGRESS NOTES
Radiologist Dr. Munoz cleared CT head and Chest Xray for MRI. KUB abdomen was cleared via report. MRI sending for patient as soon as signatures completed.

## 2024-01-06 NOTE — PROGRESS NOTES
SLP ALL NOTES  Attempted to see pt this am for dysphagia treatment, however, RN reports to HOLD. Will follow.     Jeannette Kunz MA/JOSY/SLP

## 2024-01-07 LAB
ACTH PLAS-MCNC: 21 PG/ML (ref 7.2–63.3)
BACTERIA SPEC CULT: NORMAL
BACTERIA SPEC CULT: NORMAL
SERVICE CMNT-IMP: NORMAL
SERVICE CMNT-IMP: NORMAL

## 2024-01-07 PROCEDURE — 6370000000 HC RX 637 (ALT 250 FOR IP): Performed by: STUDENT IN AN ORGANIZED HEALTH CARE EDUCATION/TRAINING PROGRAM

## 2024-01-07 PROCEDURE — 92526 ORAL FUNCTION THERAPY: CPT

## 2024-01-07 PROCEDURE — 99231 SBSQ HOSP IP/OBS SF/LOW 25: CPT | Performed by: PSYCHIATRY & NEUROLOGY

## 2024-01-07 PROCEDURE — 1100000003 HC PRIVATE W/ TELEMETRY

## 2024-01-07 PROCEDURE — 92610 EVALUATE SWALLOWING FUNCTION: CPT

## 2024-01-07 PROCEDURE — 2580000003 HC RX 258: Performed by: STUDENT IN AN ORGANIZED HEALTH CARE EDUCATION/TRAINING PROGRAM

## 2024-01-07 PROCEDURE — 6360000002 HC RX W HCPCS: Performed by: INTERNAL MEDICINE

## 2024-01-07 RX ADMIN — SODIUM CHLORIDE, PRESERVATIVE FREE 10 ML: 5 INJECTION INTRAVENOUS at 08:44

## 2024-01-07 RX ADMIN — GUAIFENESIN 1200 MG: 600 TABLET ORAL at 21:37

## 2024-01-07 RX ADMIN — SODIUM CHLORIDE, PRESERVATIVE FREE 10 ML: 5 INJECTION INTRAVENOUS at 21:37

## 2024-01-07 RX ADMIN — ENOXAPARIN SODIUM 40 MG: 100 INJECTION SUBCUTANEOUS at 21:38

## 2024-01-07 RX ADMIN — HYDRALAZINE HYDROCHLORIDE 10 MG: 20 INJECTION, SOLUTION INTRAMUSCULAR; INTRAVENOUS at 23:02

## 2024-01-07 ASSESSMENT — PAIN SCALES - GENERAL: PAINLEVEL_OUTOF10: 0

## 2024-01-07 NOTE — PROGRESS NOTES
Neurology Progress Note       Interval History: He is much more awake and alert today.  No major issues.  He is able to carry on conversation and ask intelligible questions.      Examination: Pertinent positives and negatives include:    Vitals:    01/07/24 0046 01/07/24 0308 01/07/24 0540 01/07/24 0729   BP: 132/65 (!) 140/72  135/89   Pulse: 71 58  62   Resp: 20 19  18   Temp: 99.1 °F (37.3 °C) 97.3 °F (36.3 °C) 97.7 °F (36.5 °C) 97.3 °F (36.3 °C)   TempSrc: Rectal Axillary Oral Oral   SpO2: 94% 93%  93%   Weight:             The patient is awake and alert.  He is oriented to person place and time.  No focal weakness or sensory abnormalities.  He has questions appropriately and follows all commands.      Assessment and Plan: 88-year-old man with altered mental status of unknown etiology, possibly metabolic or toxic.  He has drastically improved over the last 48 hours.  MRI of the brain does not show acute intracranial pathology.  I suspect that the patient had some form of toxic or environmental exposure leading to encephalopathy and hypothermia.  He is now better    Recommendations  No further recommendations.  Neurology will sign off        Cumulative time spent today was 25 minutes which included chart review, examining the patient, obtaining history from patient/family/other providers, reviewing imaging, and discussing the case with the patient's hospitalist, Dr. Andrade.

## 2024-01-07 NOTE — PROGRESS NOTES
Hospitalist Progress Note   Admit Date:  2024  6:30 AM   Name:  Khadar Wong   Age:  88 y.o.  Sex:  male  :  1935   MRN:  [id number]   Room:      Presenting/Chief Complaint: weakness     Reason(s) for Admission: Altered mental state [R41.82]  Hypothermia, initial encounter [T68.XXXA]  Altered mental status, unspecified altered mental status type [R41.82]     Hospital Course:   Khadar Wong is a 88 y.o. male with medical history of hypertension, stenosis of anal canal who presented with altered mental status.  Unable to obtain any further history due to patient's current baseline status.  Patient was brought in from [facility] with weakness and confusion.    In the ER, patient was found to be hypothermic with a temperature of 94.6.  Labs were grossly unremarkable.  Patient did receive 1 dose of antibiotics placed on Maria Eugenia hugger and he was admitted for further workup.      Subjective & 24hr Events:   Patient was seen and evaluated at bedside this morning.  Patient appears to be back at baseline.  He is able to talk.  Denies any significant fevers, chills, short of breath, chest pain, nausea, vomiting.  Does not recollect how he ended up in the hospital.    Assessment & Plan:     Altered mental status -resolved  Hypothermia  Appears to be back at baseline and is answering questions appropriately.    Patient still hypothermic last night without clear cause.  MRI was negative for any acute findings.  EEG was abnormal but no overt seizure-like activity.  Infectious workup negative.  UA is bland.  Chest x-ray is clear.  Blood cultures negative to date.  Procalcitonin negative.  TSH normal.  ACTH stimulation test shows normal adrenal function.  Cortisol a.m. on day of testing was normal.  No evidence of adrenal insufficiency at this time.  - Diet advanced as per SLP  -Avoid sedating medications    Bradycardia - resolved.   No further bradycardic events noted.  Patient has been

## 2024-01-07 NOTE — PROGRESS NOTES
SPEECH LANGUAGE PATHOLOGY: DYSPHAGIA  Initial Assessment and Discharge    NAME: Khadar Wong  : 1935  MRN: [id number]    ADMISSION DATE: 2024  PRIMARY DIAGNOSIS: Altered mental state  Altered mental state [R41.82]  Hypothermia, initial encounter [T68.XXXA]  Altered mental status, unspecified altered mental status type [R41.82]    ICD-10: Treatment Diagnosis: R13.11 Dysphagia, Oral Phase    RECOMMENDATIONS   Diet:  Diet Solids Recommendation: Soft & Bite Sized  Liquid Consistency Recommendation: Thin    Medications: Whole with puree     Compensatory Swallowing Strategies: Upright as possible for all oral intake;Assist feed;Remain upright for 30-45 minutes after meals;Set up assist            Patient continues to require skilled intervention:  No. Please re-consult if new concerns arise.      ASSESSMENT    Dysphagia Diagnosis: Mild oral stage dysphagia  Dysphagia Impression : Pt with no overt sign/sx of aspiration with thin via cup and starw, pureed, mixed and solid. Slow mastication secondary to poor dentition. Recommend soft and bite sized with thin liquids with medication whole in pureed. No further ST indicated.    GENERAL    History of Present Injury/Illness: Mr. Wong  has a past medical history of Cataracts, bilateral, Chest pain, CKD (chronic kidney disease) stage 3, GFR 30-59 ml/min ([facility]), Essential (primary) hypertension, and Seizure ([facility]).. He also  has a past surgical history that includes Colonoscopy () and [place] tooth extraction.       Behavior/Cognition: Alert  Communication Observation: Functional  Follows Directions: Simple       Prior Dysphagia History: none   Prior instrumental assessment: none    Current Diet : NPO  Current Liquid Diet : NPO                   Pain: Patient does not c/o pain       Vision: Within Functional Limits   Hearing: Within functional limits    OBJECTIVE      Patient Position: upright in bed    Patient Positioning: Upright in bed   Oral Motor   Labial:

## 2024-01-08 LAB
ANION GAP SERPL CALC-SCNC: 6 MMOL/L (ref 2–11)
BASOPHILS # BLD: 0 K/UL (ref 0–0.2)
BASOPHILS NFR BLD: 0 % (ref 0–2)
BUN SERPL-MCNC: 15 MG/DL (ref 8–23)
CALCIUM SERPL-MCNC: 8 MG/DL (ref 8.3–10.4)
CHLORIDE SERPL-SCNC: 114 MMOL/L (ref 103–113)
CO2 SERPL-SCNC: 24 MMOL/L (ref 21–32)
CREAT SERPL-MCNC: 1.1 MG/DL (ref 0.8–1.5)
DIFFERENTIAL METHOD BLD: ABNORMAL
EOSINOPHIL # BLD: 0.2 K/UL (ref 0–0.8)
EOSINOPHIL NFR BLD: 5 % (ref 0.5–7.8)
ERYTHROCYTE [DISTWIDTH] IN BLOOD BY AUTOMATED COUNT: 15.5 % (ref 11.9–14.6)
GLUCOSE SERPL-MCNC: 89 MG/DL (ref 65–100)
HCT VFR BLD AUTO: 32.2 % (ref 41.1–50.3)
HGB BLD-MCNC: 10.5 G/DL (ref 13.6–17.2)
IMM GRANULOCYTES # BLD AUTO: 0 K/UL (ref 0–0.5)
IMM GRANULOCYTES NFR BLD AUTO: 0 % (ref 0–5)
LYMPHOCYTES # BLD: 1.4 K/UL (ref 0.5–4.6)
LYMPHOCYTES NFR BLD: 32 % (ref 13–44)
MAGNESIUM SERPL-MCNC: 2.2 MG/DL (ref 1.8–2.4)
MCH RBC QN AUTO: 29.4 PG (ref 26.1–32.9)
MCHC RBC AUTO-ENTMCNC: 32.6 G/DL (ref 31.4–35)
MCV RBC AUTO: 90.2 FL (ref 82–102)
MONOCYTES # BLD: 0.3 K/UL (ref 0.1–1.3)
MONOCYTES NFR BLD: 7 % (ref 4–12)
NEUTS SEG # BLD: 2.6 K/UL (ref 1.7–8.2)
NEUTS SEG NFR BLD: 56 % (ref 43–78)
NRBC # BLD: 0 K/UL (ref 0–0.2)
PLATELET # BLD AUTO: 195 K/UL (ref 150–450)
PMV BLD AUTO: 9.9 FL (ref 9.4–12.3)
POTASSIUM SERPL-SCNC: 3.2 MMOL/L (ref 3.5–5.1)
RBC # BLD AUTO: 3.57 M/UL (ref 4.23–5.6)
SODIUM SERPL-SCNC: 144 MMOL/L (ref 136–146)
WBC # BLD AUTO: 4.6 K/UL (ref 4.3–11.1)

## 2024-01-08 PROCEDURE — 6370000000 HC RX 637 (ALT 250 FOR IP): Performed by: STUDENT IN AN ORGANIZED HEALTH CARE EDUCATION/TRAINING PROGRAM

## 2024-01-08 PROCEDURE — 97530 THERAPEUTIC ACTIVITIES: CPT

## 2024-01-08 PROCEDURE — 97162 PT EVAL MOD COMPLEX 30 MIN: CPT

## 2024-01-08 PROCEDURE — 1100000003 HC PRIVATE W/ TELEMETRY

## 2024-01-08 PROCEDURE — 80048 BASIC METABOLIC PNL TOTAL CA: CPT

## 2024-01-08 PROCEDURE — 85025 COMPLETE CBC W/AUTO DIFF WBC: CPT

## 2024-01-08 PROCEDURE — 83735 ASSAY OF MAGNESIUM: CPT

## 2024-01-08 PROCEDURE — 6360000002 HC RX W HCPCS: Performed by: INTERNAL MEDICINE

## 2024-01-08 PROCEDURE — 6370000000 HC RX 637 (ALT 250 FOR IP): Performed by: INTERNAL MEDICINE

## 2024-01-08 PROCEDURE — 2580000003 HC RX 258: Performed by: STUDENT IN AN ORGANIZED HEALTH CARE EDUCATION/TRAINING PROGRAM

## 2024-01-08 PROCEDURE — 36415 COLL VENOUS BLD VENIPUNCTURE: CPT

## 2024-01-08 RX ORDER — POTASSIUM CHLORIDE 20 MEQ/1
40 TABLET, EXTENDED RELEASE ORAL 2 TIMES DAILY
Status: COMPLETED | OUTPATIENT
Start: 2024-01-08 | End: 2024-01-08

## 2024-01-08 RX ADMIN — POTASSIUM CHLORIDE 40 MEQ: 1500 TABLET, EXTENDED RELEASE ORAL at 11:45

## 2024-01-08 RX ADMIN — AMLODIPINE BESYLATE 10 MG: 10 TABLET ORAL at 08:48

## 2024-01-08 RX ADMIN — SODIUM CHLORIDE, PRESERVATIVE FREE 10 ML: 5 INJECTION INTRAVENOUS at 08:49

## 2024-01-08 RX ADMIN — GUAIFENESIN 1200 MG: 600 TABLET ORAL at 21:42

## 2024-01-08 RX ADMIN — POTASSIUM CHLORIDE 40 MEQ: 1500 TABLET, EXTENDED RELEASE ORAL at 21:42

## 2024-01-08 RX ADMIN — SODIUM CHLORIDE, PRESERVATIVE FREE 10 ML: 5 INJECTION INTRAVENOUS at 21:42

## 2024-01-08 RX ADMIN — GUAIFENESIN 1200 MG: 600 TABLET ORAL at 08:48

## 2024-01-08 RX ADMIN — ENOXAPARIN SODIUM 40 MG: 100 INJECTION SUBCUTANEOUS at 21:42

## 2024-01-08 RX ADMIN — LOSARTAN POTASSIUM 25 MG: 25 TABLET, FILM COATED ORAL at 08:48

## 2024-01-08 ASSESSMENT — PAIN SCALES - GENERAL: PAINLEVEL_OUTOF10: 0

## 2024-01-08 NOTE — PROGRESS NOTES
POST FALL MANAGEMENT    Khadar Wong  MEDICAL RECORD NUMBER:  [id number]  AGE: 88 y.o.   GENDER: male  : 1935  TODAYS DATE:  2024    Details     Fall Occurred: Yes    Was the Fall Witnessed:  Yes     Brief Review of Event: Pt sitting up in recliner. Pt got up and chair alarm went off. Staff members entered room and pt was standing up, turned around and fell down on his knees. No injuries noted. Pt assisted back to chair.         Who found the patient: Primary RN & charge RN      Where was the patient at the time of the fall: In his room in front of recliner.      Patient Comments: No comments.       Date Fall Occurred:  2024 .       Time Fall Occurred: 4:45p.m.     Assessment     Post Fall Head to Toe Assessment Completed: Yes    Post Fall Predictive Analytic Score Reviewed: Yes     Post Fall Vitals Completed: Yes    Post Fall Neuro Checks Completed: Yes    Injury Occurred(if yes, describe injury):  No           Did the Patient Experience:(Check Mike all that apply)    [] Patient hit head  [] Loss of consciousness  [] Change in mental status following the fall  [] Patient is on an anticoagulant medication      CT Performed:  no    Follow-up     Persons Notified of Fall:  (Provide names of persons notified)   [x] Physician: Dr. Andrade  [] RUDI:  [x] Nursing Supervisior: Michelle  [] Manager:  [] Pharmacist:  [x] Family: Called Lisa Marvin, no answer.  [] Other:      Electronically signed by Mag Christie RN 2024 at 4:46 PM

## 2024-01-08 NOTE — CARE COORDINATION
Update: SNF referrals made per patient/family request.    Chart reviewed by RNCM and discussed in IDR    RNCM met with patient's step daughter in law, Lise, to discuss discharge planning. Lise states that patient's family has been looking for him for 6 years. Lise states that patient's Elroy henley lives in [place] ( [address] [place] [address]) and would like for patient to live with them at discharge. Phone numbers updated in chart. PT recommending SNF at discharge. SNF list given to patient/family for review. CM following.

## 2024-01-08 NOTE — PLAN OF CARE
Problem: Discharge Planning  Goal: Discharge to home or other facility with appropriate resources  1/8/2024 1824 by Mag Christie, RN  Outcome: Progressing  1/8/2024 0627 by Luz [place] RN  Outcome: Progressing     Problem: Safety - Adult  Goal: Free from fall injury  1/8/2024 1824 by Mag Christie, RN  Outcome: Progressing  1/8/2024 0627 by Luz [place] RN  Outcome: Progressing     Problem: Pain  Goal: Verbalizes/displays adequate comfort level or baseline comfort level  Outcome: Progressing     Problem: Skin/Tissue Integrity  Goal: Absence of new skin breakdown  Description: 1.  Monitor for areas of redness and/or skin breakdown  2.  Assess vascular access sites hourly  3.  Every 4-6 hours minimum:  Change oxygen saturation probe site  4.  Every 4-6 hours:  If on nasal continuous positive airway pressure, respiratory therapy assess nares and determine need for appliance change or resting period.  1/8/2024 1824 by Mag Christie RN  Outcome: Progressing  1/8/2024 0627 by Luz [place] RN  Outcome: Progressing     Problem: Safety - Medical Restraint  Goal: Remains free of injury from restraints (Restraint for Interference with Medical Device)  Description: INTERVENTIONS:  1. Determine that other, less restrictive measures have been tried or would not be effective before applying the restraint  2. Evaluate the patient's condition at the time of restraint application  3. Inform patient/family regarding the reason for restraint  4. Q2H: Monitor safety, psychosocial status, comfort, nutrition and hydration  1/8/2024 1824 by Mag Christie RN  Outcome: Progressing  1/8/2024 0627 by Luz [place] RN  Outcome: Progressing     Problem: ABCDS Injury Assessment  Goal: Absence of physical injury  Outcome: Progressing

## 2024-01-08 NOTE — PLAN OF CARE
Problem: Discharge Planning  Goal: Discharge to home or other facility with appropriate resources  Outcome: Progressing     Problem: Safety - Adult  Goal: Free from fall injury  Outcome: Progressing     Problem: Skin/Tissue Integrity  Goal: Absence of new skin breakdown  Description: 1.  Monitor for areas of redness and/or skin breakdown  2.  Assess vascular access sites hourly  3.  Every 4-6 hours minimum:  Change oxygen saturation probe site  4.  Every 4-6 hours:  If on nasal continuous positive airway pressure, respiratory therapy assess nares and determine need for appliance change or resting period.  Outcome: Progressing     Problem: Safety - Medical Restraint  Goal: Remains free of injury from restraints (Restraint for Interference with Medical Device)  Description: INTERVENTIONS:  1. Determine that other, less restrictive measures have been tried or would not be effective before applying the restraint  2. Evaluate the patient's condition at the time of restraint application  3. Inform patient/family regarding the reason for restraint  4. Q2H: Monitor safety, psychosocial status, comfort, nutrition and hydration  Outcome: Progressing

## 2024-01-08 NOTE — PROGRESS NOTES
ACUTE PHYSICAL THERAPY GOALS:   (Developed with and agreed upon by patient and/or caregiver.)  LTG:  (1.)Mr. Wong will move from supine to sit and sit to supine, scoot up and down and roll side to side in bed with INDEPENDENT within 7 day(s).   (2.)Mr. Wong will transfer from bed to chair and chair to bed with INDEPENDENT using the least restrictive device within 7 day(s).   (3.)Mr. Wong will ambulate with modified INDEPENDENCE for 250+ feet with the least restrictive/no device within 7 day(s).  (4.)Mr. Wong will perform standing static and dynamic balance activities x 8 minutes with SUPERVISION to improve safety within 7 day(s).         PHYSICAL THERAPY Initial Assessment and Daily Note  (Link to Caseload Tracking: PT Visit Days : 1  Acknowledge Orders  Time In/Out  PT Charge Capture  Rehab Caseload Tracker    Khadar Wong is a 88 y.o. male   PRIMARY DIAGNOSIS: Altered mental state  Altered mental state [R41.82]  Hypothermia, initial encounter [T68.XXXA]  Altered mental status, unspecified altered mental status type [R41.82]       Reason for Referral: Other abnormalities of gait and mobility (R26.89)  Inpatient: Payor: [employer]ICARE / Plan: [employer] MEDICARE / Product Type: *No Product type* /     ASSESSMENT:     REHAB RECOMMENDATIONS:   Recommendation to date pending progress:  Setting:  Short-term Rehab    Equipment:    To Be Determined     ASSESSMENT:  Mr. Wong lives in a shelter and is typically independent.  Patient today is alert and agreeable for PT but seems confused.  Initially could not tell me where he is, later stated hospital.  Patient transitioned to sit with CGA.  He was given min A to transfer and ambulate 75'.  Patient with decreased command following and problem solving.  He was unable to find his room after informed of room number.  Returned to room and into bathroom with min A.  Patient unable to get himself cleaned up so staff helped him.  Transferred to  Sit, Scooting, Transfer Training, Ambulation on level ground, Sitting balance , and Standing balance to improve functional Activity tolerance, Balance, Mobility, and Strength.    TREATMENT GRID:  N/A    AFTER TREATMENT PRECAUTIONS: Alarm Activated, Bed/Chair Locked, Call light within reach, Chair, Needs within reach, RN notified, and Visitors at bedside    INTERDISCIPLINARY COLLABORATION:  RN/ PCT, PT/ PTA, and RN Case Manager/      EDUCATION: Education Given To: Patient  Education Provided: Role of Therapy;Plan of Care  Education Outcome: Continued education needed    TIME IN/OUT:  Time In: 1049  Time Out: 1119  Minutes: 30    A Mandy Laws, PT

## 2024-01-08 NOTE — PROGRESS NOTES
Hospitalist Progress Note   Admit Date:  2024  6:30 AM   Name:  Khadar Wong   Age:  88 y.o.  Sex:  male  :  1935   MRN:  [id number]   Room:      Presenting/Chief Complaint: weakness     Reason(s) for Admission: Altered mental state [R41.82]  Hypothermia, initial encounter [T68.XXXA]  Altered mental status, unspecified altered mental status type [R41.82]     Hospital Course:   Khadar Wong is a 88 y.o. male with medical history of hypertension, stenosis of anal canal who presented with altered mental status.  Unable to obtain any further history due to patient's current baseline status.  Patient was brought in from [facility] with weakness and confusion.    In the ER, patient was found to be hypothermic with a temperature of 94.6.  Labs were grossly unremarkable.  Patient did receive 1 dose of antibiotics placed on Maria Eugenia hugger and he was admitted for further workup.      Subjective & 24hr Events:   Patient was seen and evaluated at bedside this morning.  Patient doing well this morning.  Denies any similar fevers, chills, shortness of breath, chest pain, nausea, vomiting.  Appears to be back at baseline.  Would like to walk around more.    Assessment & Plan:     Altered mental status -resolved  Hypothermia -resolved  Appears to be back at baseline and is answering questions appropriately.    Patient still hypothermic last night without clear cause.  MRI was negative for any acute findings.  EEG was abnormal but no overt seizure-like activity.  Infectious workup negative.  UA is bland.  Chest x-ray is clear.  Blood cultures negative to date.  Procalcitonin negative.  TSH normal.  ACTH stimulation test shows normal adrenal function.  Cortisol a.m. on day of testing was normal.  No evidence of adrenal insufficiency at this time.  -PT/OT -recommending short-term rehab.  Family in agreement.  -Avoid sedating medications    Bradycardia - resolved.   No further bradycardic events  noted.  Patient has been normothermic.  -Monitor on telemetry    Hypertension  Normotensive currently.  -Continue with amlodipine 10 mg and losartan 25 mg daily    Anticipated Discharge Arrangements:   Therapy has not evaluated yet    PT/OT evals and PPD ordered?  Yes  Diet:  ADULT DIET; Dysphagia - Soft and Bite Sized  VTE prophylaxis: Lovenox  Code status: Full Code    Disposition: Patient is medically stable for discharge.  Appears to be back at baseline.  As per discussion with family, agreeable to short-term rehab as patient is functioning below baseline.  Case management aware, family to choose facility.    Non-peripheral Lines and Tubes (if present):                Telemetry (if present):  Cardiac/Telemetry Monitor On: Portable telemetry pack applied        Hospital Problems:  Principal Problem:    Altered mental state  Active Problems:    Essential (primary) hypertension  Resolved Problems:    * No resolved hospital problems. *      Objective:   Patient Vitals for the past 24 hrs:   Temp Pulse Resp BP SpO2   01/08/24 1130 97.3 °F (36.3 °C) 74 18 130/79 97 %   01/08/24 0830 -- 83 -- -- --   01/08/24 0752 97.9 °F (36.6 °C) 87 18 133/77 97 %   01/08/24 0545 97.7 °F (36.5 °C) -- -- -- --   01/08/24 0415 97.5 °F (36.4 °C) -- -- -- --   01/08/24 0334 97.7 °F (36.5 °C) 69 19 107/63 97 %   01/08/24 0233 97.4 °F (36.3 °C) -- -- -- --   01/08/24 0130 97.1 °F (36.2 °C) -- -- -- --   01/07/24 2358 97.2 °F (36.2 °C) 63 -- (!) 121/59 --   01/07/24 2251 97.2 °F (36.2 °C) 68 20 (!) 168/88 97 %   01/07/24 2233 -- 66 -- -- --   01/07/24 1927 97.2 °F (36.2 °C) 65 20 (!) 153/85 100 %   01/07/24 1509 97.3 °F (36.3 °C) 68 18 116/65 92 %         Oxygen Therapy  SpO2: 97 %  Pulse via Oximetry: 86 beats per minute  O2 Device: None (Room air)    Estimated body mass index is 26.47 kg/m² as calculated from the following:    Height as of 12/14/23: 1.626 m (5' 4\").    Weight as of this encounter: 69.9 kg (154 lb 3.2 oz).    Intake/Output  01/08/24  4:01 AM   Result Value Ref Range    Sodium 144 136 - 146 mmol/L    Potassium 3.2 (L) 3.5 - 5.1 mmol/L    Chloride 114 (H) 103 - 113 mmol/L    CO2 24 21 - 32 mmol/L    Anion Gap 6 2 - 11 mmol/L    Glucose 89 65 - 100 mg/dL    BUN 15 8 - 23 MG/DL    Creatinine 1.10 0.8 - 1.5 MG/DL    Est, Glom Filt Rate >60 >60 ml/min/1.73m2    Calcium 8.0 (L) 8.3 - 10.4 MG/DL   Magnesium    Collection Time: 01/08/24  4:01 AM   Result Value Ref Range    Magnesium 2.2 1.8 - 2.4 mg/dL       Current Meds:  Current Facility-Administered Medications   Medication Dose Route Frequency    potassium chloride (KLOR-CON M) extended release tablet 40 mEq  40 mEq Oral BID    enoxaparin (LOVENOX) injection 40 mg  40 mg SubCUTAneous Q24H    hydrALAZINE (APRESOLINE) injection 10 mg  10 mg IntraVENous Q4H PRN    amLODIPine (NORVASC) tablet 10 mg  10 mg Oral Daily    losartan (COZAAR) tablet 25 mg  25 mg Oral Daily    sodium chloride flush 0.9 % injection 5-40 mL  5-40 mL IntraVENous 2 times per day    sodium chloride flush 0.9 % injection 5-40 mL  5-40 mL IntraVENous PRN    0.9 % sodium chloride infusion   IntraVENous PRN    ondansetron (ZOFRAN-ODT) disintegrating tablet 4 mg  4 mg Oral Q8H PRN    Or    ondansetron (ZOFRAN) injection 4 mg  4 mg IntraVENous Q6H PRN    polyethylene glycol (GLYCOLAX) packet 17 g  17 g Oral Daily PRN    acetaminophen (TYLENOL) tablet 650 mg  650 mg Oral Q6H PRN    Or    acetaminophen (TYLENOL) suppository 650 mg  650 mg Rectal Q6H PRN    guaiFENesin (MUCINEX) extended release tablet 1,200 mg  1,200 mg Oral BID    OLANZapine (ZyPREXA) 5 mg in sterile water 1 mL injection  5 mg IntraMUSCular Q12H PRN       Signed:  Owen Andrade MD    Part of this note may have been written by using a voice dictation software.  The note has been proof read but may still contain some grammatical/other typographical errors.

## 2024-01-09 LAB
ANION GAP SERPL CALC-SCNC: 4 MMOL/L (ref 2–11)
BASOPHILS # BLD: 0 K/UL (ref 0–0.2)
BASOPHILS NFR BLD: 1 % (ref 0–2)
BUN SERPL-MCNC: 13 MG/DL (ref 8–23)
CALCIUM SERPL-MCNC: 8.4 MG/DL (ref 8.3–10.4)
CHLORIDE SERPL-SCNC: 117 MMOL/L (ref 103–113)
CO2 SERPL-SCNC: 23 MMOL/L (ref 21–32)
CREAT SERPL-MCNC: 1.2 MG/DL (ref 0.8–1.5)
DIFFERENTIAL METHOD BLD: ABNORMAL
EOSINOPHIL # BLD: 0.2 K/UL (ref 0–0.8)
EOSINOPHIL NFR BLD: 6 % (ref 0.5–7.8)
ERYTHROCYTE [DISTWIDTH] IN BLOOD BY AUTOMATED COUNT: 15.6 % (ref 11.9–14.6)
GLUCOSE SERPL-MCNC: 86 MG/DL (ref 65–100)
HCT VFR BLD AUTO: 31.5 % (ref 41.1–50.3)
HGB BLD-MCNC: 10.2 G/DL (ref 13.6–17.2)
IMM GRANULOCYTES # BLD AUTO: 0 K/UL (ref 0–0.5)
IMM GRANULOCYTES NFR BLD AUTO: 1 % (ref 0–5)
LYMPHOCYTES # BLD: 1.2 K/UL (ref 0.5–4.6)
LYMPHOCYTES NFR BLD: 35 % (ref 13–44)
MCH RBC QN AUTO: 29.2 PG (ref 26.1–32.9)
MCHC RBC AUTO-ENTMCNC: 32.4 G/DL (ref 31.4–35)
MCV RBC AUTO: 90.3 FL (ref 82–102)
MONOCYTES # BLD: 0.2 K/UL (ref 0.1–1.3)
MONOCYTES NFR BLD: 7 % (ref 4–12)
NEUTS SEG # BLD: 1.7 K/UL (ref 1.7–8.2)
NEUTS SEG NFR BLD: 50 % (ref 43–78)
NRBC # BLD: 0 K/UL (ref 0–0.2)
PLATELET # BLD AUTO: 172 K/UL (ref 150–450)
PMV BLD AUTO: 9.9 FL (ref 9.4–12.3)
POTASSIUM SERPL-SCNC: 3.9 MMOL/L (ref 3.5–5.1)
RBC # BLD AUTO: 3.49 M/UL (ref 4.23–5.6)
SODIUM SERPL-SCNC: 144 MMOL/L (ref 136–146)
WBC # BLD AUTO: 3.3 K/UL (ref 4.3–11.1)

## 2024-01-09 PROCEDURE — 1100000003 HC PRIVATE W/ TELEMETRY

## 2024-01-09 PROCEDURE — 97530 THERAPEUTIC ACTIVITIES: CPT

## 2024-01-09 PROCEDURE — 6370000000 HC RX 637 (ALT 250 FOR IP): Performed by: STUDENT IN AN ORGANIZED HEALTH CARE EDUCATION/TRAINING PROGRAM

## 2024-01-09 PROCEDURE — 85025 COMPLETE CBC W/AUTO DIFF WBC: CPT

## 2024-01-09 PROCEDURE — 80048 BASIC METABOLIC PNL TOTAL CA: CPT

## 2024-01-09 PROCEDURE — 2580000003 HC RX 258: Performed by: STUDENT IN AN ORGANIZED HEALTH CARE EDUCATION/TRAINING PROGRAM

## 2024-01-09 PROCEDURE — 36415 COLL VENOUS BLD VENIPUNCTURE: CPT

## 2024-01-09 PROCEDURE — 6360000002 HC RX W HCPCS: Performed by: INTERNAL MEDICINE

## 2024-01-09 RX ADMIN — AMLODIPINE BESYLATE 10 MG: 10 TABLET ORAL at 07:47

## 2024-01-09 RX ADMIN — LOSARTAN POTASSIUM 25 MG: 25 TABLET, FILM COATED ORAL at 07:47

## 2024-01-09 RX ADMIN — ENOXAPARIN SODIUM 40 MG: 100 INJECTION SUBCUTANEOUS at 22:02

## 2024-01-09 RX ADMIN — GUAIFENESIN 1200 MG: 600 TABLET ORAL at 22:02

## 2024-01-09 RX ADMIN — GUAIFENESIN 1200 MG: 600 TABLET ORAL at 07:47

## 2024-01-09 RX ADMIN — SODIUM CHLORIDE, PRESERVATIVE FREE 10 ML: 5 INJECTION INTRAVENOUS at 22:02

## 2024-01-09 ASSESSMENT — PAIN SCALES - GENERAL
PAINLEVEL_OUTOF10: 0
PAINLEVEL_OUTOF10: 0

## 2024-01-09 NOTE — PROGRESS NOTES
END OF SHIFT NOTE:    INTAKE/OUTPUT  No intake/output data recorded.  Voiding: Yes  Catheter: No  Drain:              Flatus: Patient does have flatus present.    Stool:  occurrences.    Characteristics:  Stool Appearance: Soft  Stool Color: Brown, Tan  Stool Amount: Smear  Stool Assessment  Incontinence: Yes  Stool Appearance: Soft  Stool Color: Brown, Tan  Stool Amount: Smear  Stool Source: Rectum  Last BM (including prior to admit): 01/09/24    Emesis:  occurrences.    Characteristics:        VITAL SIGNS  Patient Vitals for the past 12 hrs:   Pulse Resp BP SpO2   01/09/24 1516 62 14 (!) 144/73 98 %   01/09/24 0734 59 18 (!) 127/113 99 %       Pain Assessment  Pain Level: 0 (01/09/24 0745)     Patient's Stated Pain Goal: 0 - No pain    Ambulating  Yes, worked with PT, ambulated within the room and in the hallways.    Shift report given to oncoming nurse at the bedside.    Fatmata Jessica RN

## 2024-01-09 NOTE — PROGRESS NOTES
ACUTE PHYSICAL THERAPY GOALS:   (Developed with and agreed upon by patient and/or caregiver.)    ACUTE PHYSICAL THERAPY GOALS:   (Developed with and agreed upon by patient and/or caregiver.)  LTG:  (1.)Mr. Wong will move from supine to sit and sit to supine, scoot up and down and roll side to side in bed with INDEPENDENT within 7 day(s).   (2.)Mr. Wong will transfer from bed to chair and chair to bed with INDEPENDENT using the least restrictive device within 7 day(s).   (3.)Mr. Wong will ambulate with modified INDEPENDENCE for 250+ feet with the least restrictive/no device within 7 day(s).  (4.)Mr. Wong will perform standing static and dynamic balance activities x 8 minutes with SUPERVISION to improve safety within 7 day(s).        PHYSICAL THERAPY: Daily Note PM   (Link to Caseload Tracking: PT Visit Days : 2  Time In/Out PT Charge Capture  Rehab Caseload Tracker  Orders    Khadar Wong is a 88 y.o. male   PRIMARY DIAGNOSIS: Altered mental state  Altered mental state [R41.82]  Hypothermia, initial encounter [T68.XXXA]  Altered mental status, unspecified altered mental status type [R41.82]       Inpatient: Payor: [employer]ICARE / Plan: [employer] MEDICARE / Product Type: *No Product type* /     ASSESSMENT:     REHAB RECOMMENDATIONS:   Recommendation to date pending progress:  Setting:  Short-term Rehab    Equipment:    To Be Determined     ASSESSMENT:  Mr. Wong was supine in bed and agreeable for PT.  He still seems confused.  He transitioned to sit with CGA to edge of bed.  He stood with CGA, but very unsteady so given RW.  Patient ambulated 100'x2 with RW and CGA.  Patient much safer with RW.  Worked on standing balance activities in room, ambulating to closer, attempting to  objects from floor.  Progressing well with balance and safety.  .     SUBJECTIVE:   Mr. Wong states, \"When are you going to take me to get groceries and to the bank?\"     Social/Functional Lives With:

## 2024-01-09 NOTE — PROGRESS NOTES
Spiritual Care Visit, initial visit attempted.    Patient did not respond when spoken to.    Visit by Mason Valencia, Staff . Tyler., Albert.ZAY., B.A.

## 2024-01-09 NOTE — PROGRESS NOTES
END OF SHIFT NOTE:    INTAKE/OUTPUT  No intake/output data recorded.  Voiding: Yes incont. brief  Catheter: No  Drain:              Flatus: Patient does have flatus present.    Stool: 1 occurrences.    Characteristics:  Stool Appearance: Loose  Stool Color: Brown  Stool Amount: Smear  Stool Assessment  Incontinence: Yes  Stool Appearance: Loose  Stool Color: Brown  Stool Amount: Smear  Stool Source: Rectum  Last BM (including prior to admit): 01/07/24    Emesis:  occurrences.    Characteristics:        VITAL SIGNS  Patient Vitals for the past 12 hrs:   Temp Pulse Resp BP SpO2   01/09/24 0446 98 °F (36.7 °C) 56 18 (!) 150/75 98 %   01/08/24 2320 98.2 °F (36.8 °C) 72 18 136/80 98 %   01/08/24 1956 98 °F (36.7 °C) 77 18 132/77 97 %       Pain Assessment  Pain Level: 0 (01/08/24 0750)     Patient's Stated Pain Goal: 0 - No pain    Ambulating  No    Shift report given to oncoming nurse at the bedside.    Nathan Reyes RN

## 2024-01-09 NOTE — CARE COORDINATION
Elroy called CM and stated that he is sitting next to Lise at this time. Both were informed that patient was accepted into [facility], waiting for insurance authorization. Both were in agreement with this discharge plan.     CM to follow up.

## 2024-01-09 NOTE — CARE COORDINATION
CM reviewed chart and saw that patient has been accepted to [facility] for STR. OMAR spoke to Jalyn at [facility] who stated she will start insurance pre cert.     CM to follow up.

## 2024-01-09 NOTE — PROGRESS NOTES
Hospitalist Progress Note   Admit Date:  2024  6:30 AM   Name:  Khadar Wong   Age:  88 y.o.  Sex:  male  :  1935   MRN:  [id number]   Room:  /    Presenting/Chief Complaint: weakness     Reason(s) for Admission: Altered mental state [R41.82]  Hypothermia, initial encounter [T68.XXXA]  Altered mental status, unspecified altered mental status type [R41.82]     Hospital Course:   Khadar Wong is a 88 y.o. male with medical history of hypertension, stenosis of anal canal who presented with altered mental status.  Unable to obtain any further history due to patient's current baseline status.  Patient was brought in from [facility] with weakness and confusion.    In the ER, patient was found to be hypothermic with a temperature of 94.6.  Labs were grossly unremarkable.  Patient did receive 1 dose of antibiotics placed on Maria Eugenia hugger and he was admitted for further workup.      Subjective & 24hr Events:   Patient was seen and evaluated at bedside this morning.  Patient appears to be somewhat confused this morning.  Reports no significant pain or tenderness or other complaints.  Had breakfast.      Assessment & Plan:     Altered mental status -resolved  Hypothermia -resolved  Appears to be back at baseline and is answering questions appropriately.    Patient still hypothermic last night without clear cause.  MRI was negative for any acute findings.  EEG was abnormal but no overt seizure-like activity.  Infectious workup negative.  UA is bland.  Chest x-ray is clear.  Blood cultures negative to date.  Procalcitonin negative.  TSH normal.  ACTH stimulation test shows normal adrenal function.  Cortisol a.m. on day of testing was normal.  No evidence of adrenal insufficiency at this time.  -PT/OT -excepted to short-term rehab, pending insurance authorization  -Avoid sedating medications    Bradycardia - resolved.   No further bradycardic events noted.  Patient has been     Magnesium 2.2 1.8 - 2.4 mg/dL   CBC with Auto Differential    Collection Time: 01/09/24  3:50 AM   Result Value Ref Range    WBC 3.3 (L) 4.3 - 11.1 K/uL    RBC 3.49 (L) 4.23 - 5.6 M/uL    Hemoglobin 10.2 (L) 13.6 - 17.2 g/dL    Hematocrit 31.5 (L) 41.1 - 50.3 %    MCV 90.3 82 - 102 FL    MCH 29.2 26.1 - 32.9 PG    MCHC 32.4 31.4 - 35.0 g/dL    RDW 15.6 (H) 11.9 - 14.6 %    Platelets 172 150 - 450 K/uL    MPV 9.9 9.4 - 12.3 FL    nRBC 0.00 0.0 - 0.2 K/uL    Differential Type AUTOMATED      Neutrophils % 50 43 - 78 %    Lymphocytes % 35 13 - 44 %    Monocytes % 7 4.0 - 12.0 %    Eosinophils % 6 0.5 - 7.8 %    Basophils % 1 0.0 - 2.0 %    Immature Granulocytes 1 0.0 - 5.0 %    Neutrophils Absolute 1.7 1.7 - 8.2 K/UL    Lymphocytes Absolute 1.2 0.5 - 4.6 K/UL    Monocytes Absolute 0.2 0.1 - 1.3 K/UL    Eosinophils Absolute 0.2 0.0 - 0.8 K/UL    Basophils Absolute 0.0 0.0 - 0.2 K/UL    Absolute Immature Granulocyte 0.0 0.0 - 0.5 K/UL   Basic Metabolic Panel w/ Reflex to MG    Collection Time: 01/09/24  3:50 AM   Result Value Ref Range    Sodium 144 136 - 146 mmol/L    Potassium 3.9 3.5 - 5.1 mmol/L    Chloride 117 (H) 103 - 113 mmol/L    CO2 23 21 - 32 mmol/L    Anion Gap 4 2 - 11 mmol/L    Glucose 86 65 - 100 mg/dL    BUN 13 8 - 23 MG/DL    Creatinine 1.20 0.8 - 1.5 MG/DL    Est, Glom Filt Rate 59 (L) >60 ml/min/1.73m2    Calcium 8.4 8.3 - 10.4 MG/DL       Current Meds:  Current Facility-Administered Medications   Medication Dose Route Frequency    enoxaparin (LOVENOX) injection 40 mg  40 mg SubCUTAneous Q24H    hydrALAZINE (APRESOLINE) injection 10 mg  10 mg IntraVENous Q4H PRN    amLODIPine (NORVASC) tablet 10 mg  10 mg Oral Daily    losartan (COZAAR) tablet 25 mg  25 mg Oral Daily    sodium chloride flush 0.9 % injection 5-40 mL  5-40 mL IntraVENous 2 times per day    sodium chloride flush 0.9 % injection 5-40 mL  5-40 mL IntraVENous PRN    0.9 % sodium chloride infusion   IntraVENous PRN    ondansetron

## 2024-01-10 LAB
ANION GAP SERPL CALC-SCNC: 6 MMOL/L (ref 2–11)
BASOPHILS # BLD: 0 K/UL (ref 0–0.2)
BASOPHILS NFR BLD: 1 % (ref 0–2)
BUN SERPL-MCNC: 12 MG/DL (ref 8–23)
CALCIUM SERPL-MCNC: 8.6 MG/DL (ref 8.3–10.4)
CHLORIDE SERPL-SCNC: 114 MMOL/L (ref 103–113)
CO2 SERPL-SCNC: 22 MMOL/L (ref 21–32)
CREAT SERPL-MCNC: 1.1 MG/DL (ref 0.8–1.5)
DIFFERENTIAL METHOD BLD: ABNORMAL
EOSINOPHIL # BLD: 0.2 K/UL (ref 0–0.8)
EOSINOPHIL NFR BLD: 5 % (ref 0.5–7.8)
ERYTHROCYTE [DISTWIDTH] IN BLOOD BY AUTOMATED COUNT: 15.7 % (ref 11.9–14.6)
GLUCOSE SERPL-MCNC: 77 MG/DL (ref 65–100)
HCT VFR BLD AUTO: 31.1 % (ref 41.1–50.3)
HGB BLD-MCNC: 10.1 G/DL (ref 13.6–17.2)
IMM GRANULOCYTES # BLD AUTO: 0 K/UL (ref 0–0.5)
IMM GRANULOCYTES NFR BLD AUTO: 1 % (ref 0–5)
LYMPHOCYTES # BLD: 1.2 K/UL (ref 0.5–4.6)
LYMPHOCYTES NFR BLD: 28 % (ref 13–44)
MCH RBC QN AUTO: 29.5 PG (ref 26.1–32.9)
MCHC RBC AUTO-ENTMCNC: 32.5 G/DL (ref 31.4–35)
MCV RBC AUTO: 90.9 FL (ref 82–102)
MONOCYTES # BLD: 0.3 K/UL (ref 0.1–1.3)
MONOCYTES NFR BLD: 7 % (ref 4–12)
NEUTS SEG # BLD: 2.5 K/UL (ref 1.7–8.2)
NEUTS SEG NFR BLD: 58 % (ref 43–78)
NRBC # BLD: 0 K/UL (ref 0–0.2)
PLATELET # BLD AUTO: 188 K/UL (ref 150–450)
PMV BLD AUTO: 10 FL (ref 9.4–12.3)
POTASSIUM SERPL-SCNC: 3.9 MMOL/L (ref 3.5–5.1)
RBC # BLD AUTO: 3.42 M/UL (ref 4.23–5.6)
SODIUM SERPL-SCNC: 142 MMOL/L (ref 136–146)
WBC # BLD AUTO: 4.2 K/UL (ref 4.3–11.1)

## 2024-01-10 PROCEDURE — 85025 COMPLETE CBC W/AUTO DIFF WBC: CPT

## 2024-01-10 PROCEDURE — 80048 BASIC METABOLIC PNL TOTAL CA: CPT

## 2024-01-10 PROCEDURE — 97530 THERAPEUTIC ACTIVITIES: CPT

## 2024-01-10 PROCEDURE — 36415 COLL VENOUS BLD VENIPUNCTURE: CPT

## 2024-01-10 PROCEDURE — 97165 OT EVAL LOW COMPLEX 30 MIN: CPT

## 2024-01-10 PROCEDURE — 6360000002 HC RX W HCPCS: Performed by: INTERNAL MEDICINE

## 2024-01-10 PROCEDURE — 1100000000 HC RM PRIVATE

## 2024-01-10 PROCEDURE — 2580000003 HC RX 258: Performed by: STUDENT IN AN ORGANIZED HEALTH CARE EDUCATION/TRAINING PROGRAM

## 2024-01-10 PROCEDURE — 6370000000 HC RX 637 (ALT 250 FOR IP): Performed by: STUDENT IN AN ORGANIZED HEALTH CARE EDUCATION/TRAINING PROGRAM

## 2024-01-10 RX ADMIN — GUAIFENESIN 1200 MG: 600 TABLET ORAL at 22:08

## 2024-01-10 RX ADMIN — LOSARTAN POTASSIUM 25 MG: 25 TABLET, FILM COATED ORAL at 08:20

## 2024-01-10 RX ADMIN — SODIUM CHLORIDE, PRESERVATIVE FREE 10 ML: 5 INJECTION INTRAVENOUS at 22:11

## 2024-01-10 RX ADMIN — AMLODIPINE BESYLATE 10 MG: 10 TABLET ORAL at 08:20

## 2024-01-10 RX ADMIN — GUAIFENESIN 1200 MG: 600 TABLET ORAL at 08:19

## 2024-01-10 RX ADMIN — ENOXAPARIN SODIUM 40 MG: 100 INJECTION SUBCUTANEOUS at 22:08

## 2024-01-10 ASSESSMENT — PAIN SCALES - GENERAL
PAINLEVEL_OUTOF10: 0
PAINLEVEL_OUTOF10: 0

## 2024-01-10 NOTE — PROGRESS NOTES
END OF SHIFT NOTE:    INTAKE/OUTPUT  01/09 0701 - 01/10 0700  In: 300 [P.O.:300]  Out: -   Voiding: Yes  Catheter: No  Drain:              Flatus: Patient does have flatus present.    Stool:  occurrences.    Characteristics:  Stool Appearance: Soft  Stool Color: Brown, Tan  Stool Amount: Smear  Stool Assessment  Incontinence: Yes  Stool Appearance: Soft  Stool Color: Brown, Tan  Stool Amount: Smear  Stool Source: Rectum  Last BM (including prior to admit): 01/09/24    Emesis:  occurrences.    Characteristics:        VITAL SIGNS  Patient Vitals for the past 12 hrs:   Temp Pulse Resp BP SpO2   01/10/24 0304 97.3 °F (36.3 °C) 66 19 132/70 94 %   01/10/24 0150 97.7 °F (36.5 °C) -- -- -- --   01/10/24 0000 97.3 °F (36.3 °C) -- -- -- --   01/09/24 2315 -- 69 20 (!) 149/76 --   01/09/24 2230 (!) 94.5 °F (34.7 °C) -- -- -- --   01/09/24 2130 (!) 93.7 °F (34.3 °C) -- -- -- --   01/09/24 2056 -- 65 -- -- --   01/09/24 2030 (!) 94.5 °F (34.7 °C) -- -- -- --   01/09/24 1930 (!) 93.7 °F (34.3 °C) -- -- -- --   01/09/24 1900 (!) 93.7 °F (34.3 °C) 65 18 (!) 149/75 99 %       Pain Assessment  Pain Level: 0 (01/09/24 1935)     Patient's Stated Pain Goal: 0 - No pain    Ambulating  No    Shift report given to oncoming nurse at the bedside.    Luz Grace RN

## 2024-01-10 NOTE — PLAN OF CARE
Problem: Discharge Planning  Goal: Discharge to home or other facility with appropriate resources  Outcome: Progressing     Problem: Safety - Adult  Goal: Free from fall injury  Outcome: Progressing     Problem: Pain  Goal: Verbalizes/displays adequate comfort level or baseline comfort level  Outcome: Progressing     Problem: Skin/Tissue Integrity  Goal: Absence of new skin breakdown  Description: 1.  Monitor for areas of redness and/or skin breakdown  2.  Assess vascular access sites hourly  3.  Every 4-6 hours minimum:  Change oxygen saturation probe site  4.  Every 4-6 hours:  If on nasal continuous positive airway pressure, respiratory therapy assess nares and determine need for appliance change or resting period.  Outcome: Progressing     Problem: Safety - Medical Restraint  Goal: Remains free of injury from restraints (Restraint for Interference with Medical Device)  Description: INTERVENTIONS:  1. Determine that other, less restrictive measures have been tried or would not be effective before applying the restraint  2. Evaluate the patient's condition at the time of restraint application  3. Inform patient/family regarding the reason for restraint  4. Q2H: Monitor safety, psychosocial status, comfort, nutrition and hydration  Outcome: Progressing

## 2024-01-10 NOTE — PROGRESS NOTES
END OF SHIFT NOTE:    INTAKE/OUTPUT  01/09 0701 - 01/10 0700  In: 300 [P.O.:300]  Out: -   Voiding: Yes  Catheter: No  Drain:              Flatus: Patient does have flatus present.    Stool:  occurrences.    Characteristics:  Stool Appearance: Soft  Stool Color: Brown, Tan  Stool Amount: Smear  Stool Assessment  Incontinence: Yes  Stool Appearance: Soft  Stool Color: Brown, Tan  Stool Amount: Smear  Stool Source: Rectum  Last BM (including prior to admit): 01/09/24    Emesis:  occurrences.    Characteristics:        VITAL SIGNS  Patient Vitals for the past 12 hrs:   Temp Pulse Resp BP SpO2   01/10/24 1629 97.7 °F (36.5 °C) 68 19 (!) 116/54 93 %   01/10/24 1315 97.5 °F (36.4 °C) 69 18 114/69 96 %   01/10/24 1145 97.4 °F (36.3 °C) 69 19 123/60 94 %   01/10/24 0717 97.7 °F (36.5 °C) 70 20 139/71 94 %       Pain Assessment  Pain Level: 0 (01/10/24 1455)     Patient's Stated Pain Goal: 0 - No pain    Ambulating  Yes, ambulated with therapy in the hallway and room.     Shift report given to oncoming nurse at the bedside.    Fatmata Jessica RN

## 2024-01-10 NOTE — PROGRESS NOTES
ACUTE OCCUPATIONAL THERAPY GOALS:   (Developed with and agreed upon by patient and/or caregiver.)  1. Pt will complete LB ADL Mod A with AE as needed.   2. Pt will complete toileting Mod A with AE as needed.   3. Pt will complete UB ADL supervision.  4. Pt will tolerate 25 minutes of OT treatment requiring 1-2 breaks as needed.   5. Pt will complete grooming tasks while standing at sink Min A.  6. Pt will complete functional mobility via RW CGA.  7. Pt will tolerate BUE exercises to increase strength for safe, functional transfers, ADL participation.     Timeframe: 7 visits     OCCUPATIONAL THERAPY Initial Assessment, Daily Note, and AM       OT Visit Days: 1  Acknowledge Orders  Time  OT Charge Capture  Rehab Caseload Tracker      Khadar Wong is a 88 y.o. male   PRIMARY DIAGNOSIS: Altered mental state  Altered mental state [R41.82]  Hypothermia, initial encounter [T68.XXXA]  Altered mental status, unspecified altered mental status type [R41.82]       Reason for Referral: Generalized Muscle Weakness (M62.81)  Inpatient: Payor: [employer] MEDICARE / Plan: ABSOL[employer] C[employer] MEDICARE / Product Type: *No Product type* /     ASSESSMENT:     REHAB RECOMMENDATIONS:   Recommendation to date pending progress:  Setting:  Short-term Rehab    Equipment:    To Be Determined     ASSESSMENT:  Mr. Wong is a 88 y M who was admitted for AMS. Pt is from shelter but will be living with son after rehab stay. Patient has been missing from family for years up until now. Pt was received supine in bed. Pt required assistance for functional mobility and ADLs today due to generalized weakness, confusion. Pt has deficits in strength, balance, activity tolerance, and performing ADLs. Pt requires continued skilled OT services due to performing functionally below baseline.       [facility] AM-PAC™ “6 Clicks” Daily Activity Inpatient Short Form:    AM-PAC Daily Activity - Inpatient   How much help is needed for putting on  [x] [] [] [] [] [x]    Stand to Sit [] [] [] [] [] [x] [] [] [] [] [x]    Tub/Shower [] [] [] [] [] [] [] [] [] [] []     Toilet [] [] [] [] [] [] [] [] [] [] []      [] [] [] [] [] [] [] [] [] [] []    I=Independent, Mod I=Modified Independent, S=Supervision/Setup, SBA=Standby Assistance, CGA=Contact Guard Assistance, Min=Minimal Assistance, Mod=Moderate Assistance, Max=Maximal Assistance, Total=Total Assistance, NT=Not Tested    ACTIVITIES OF DAILY LIVING: I Mod I S SBA CGA Min Mod Max Total NT Comments   BASIC ADLs:              Upper Body Bathing  [] [] [] [] [] [] [] [] [] []     Lower Body Bathing [] [] [] [] [] [] [] [] [] []     Toileting [] [] [] [] [] [] [] [] [] []    Upper Body Dressing [] [] [] [] [] [] [] [] [] []    Lower Body Dressing [] [] [] [] [] [] [] [x] [] [] Sitting EOB socks   Feeding [] [] [] [] [] [] [] [] [] []    Grooming [] [] [] [] [] [] [x] [] [] [] Sitting EOB combing hair; A to see activity through and for thoroughness   Personal Device Care [] [] [] [] [] [] [] [] [] []    Functional Mobility [] [] [] [] [] [x] [] [] [] [] X2 RW, chair follow   I=Independent, Mod I=Modified Independent, S=Supervision/Setup, SBA=Standby Assistance, CGA=Contact Guard Assistance, Min=Minimal Assistance, Mod=Moderate Assistance, Max=Maximal Assistance, Total=Total Assistance, NT=Not Tested    PLAN:   FREQUENCY/DURATION   OT Plan of Care: 3 times/week for duration of hospital stay or until stated goals are met, whichever comes first.    PROBLEM LIST:   (Skilled intervention is medically necessary to address:)  Decreased ADL/Functional Activities  Decreased Activity Tolerance  Decreased Balance  Decreased Cognition  Decreased Coordination  Decreased Gait Ability  Decreased Safety Awareness  Decreased Strength  Decreased Transfer Abilities   INTERVENTIONS PLANNED:  (Benefits and precautions of occupational therapy have been discussed with the patient.)  Self Care Training  Therapeutic Activity  Therapeutic

## 2024-01-10 NOTE — CARE COORDINATION
Patient's discharge plan is to go to [facility] for STR.     Patient's insurance authorization is still pending at this time.     CM to follow up.

## 2024-01-10 NOTE — PROGRESS NOTES
Hospitalist Progress Note   Admit Date:  2024  6:30 AM   Name:  Khadar Wong   Age:  88 y.o.  Sex:  male  :  1935   MRN:  [id number]   Room:      Presenting/Chief Complaint: weakness     Reason(s) for Admission: Altered mental state [R41.82]  Hypothermia, initial encounter [T68.XXXA]  Altered mental status, unspecified altered mental status type [R41.82]     Hospital Course:   Khadar Wong is a 88 y.o. male with medical history of hypertension, stenosis of anal canal who presented with altered mental status.  Unable to obtain any further history due to patient's current baseline status.  Patient was brought in from [facility] with weakness and confusion.    In the ER, patient was found to be hypothermic with a temperature of 94.6.  Labs were grossly unremarkable.  Patient did receive 1 dose of antibiotics placed on Maria Eugenia hugger and he was admitted for further workup. Neuro work up, infectious work up, and endocrine/adrenal work up all negative.       Subjective & 24hr Events:   Sitting up in bedside chair. 1:1 sitter nearby. Pt with some labile axillary temps reported by staff but asymptomatic on exam. Oriented to self only.   Hallucinations reported by staff.     Assessment & Plan:     Altered mental status -resolved  Hypothermia   Appears to be back at baseline and is answering questions appropriately.    Patient still hypothermic last night without clear cause.  MRI was negative for any acute findings.  EEG was abnormal but no overt seizure-like activity.  Infectious workup negative.  UA is bland.  Chest x-ray is clear.  Blood cultures negative to date.  Procalcitonin negative.  TSH normal.  ACTH stimulation test shows normal adrenal function.  Cortisol a.m. on day of testing was normal.  No evidence of adrenal insufficiency at this time.  PT/OT accepted to short-term rehab, pending insurance authorization  Avoid sedating medications  If hypothermia recurs, will request

## 2024-01-11 VITALS
RESPIRATION RATE: 18 BRPM | TEMPERATURE: 98.1 F | WEIGHT: 195.3 LBS | HEART RATE: 68 BPM | OXYGEN SATURATION: 95 % | DIASTOLIC BLOOD PRESSURE: 72 MMHG | BODY MASS INDEX: 33.52 KG/M2 | SYSTOLIC BLOOD PRESSURE: 130 MMHG

## 2024-01-11 PROBLEM — R00.1 BRADYCARDIA: Status: ACTIVE | Noted: 2024-01-11

## 2024-01-11 PROBLEM — T68.XXXA HYPOTHERMIA: Status: ACTIVE | Noted: 2024-01-11

## 2024-01-11 LAB
Lab: NORMAL
Lab: NORMAL
REFERENCE LAB: NORMAL

## 2024-01-11 PROCEDURE — 6370000000 HC RX 637 (ALT 250 FOR IP): Performed by: STUDENT IN AN ORGANIZED HEALTH CARE EDUCATION/TRAINING PROGRAM

## 2024-01-11 RX ORDER — AMLODIPINE BESYLATE 10 MG/1
10 TABLET ORAL DAILY
Qty: 30 TABLET | Refills: 3 | Status: SHIPPED | OUTPATIENT
Start: 2024-01-12

## 2024-01-11 RX ORDER — LOSARTAN POTASSIUM 25 MG/1
25 TABLET ORAL DAILY
Qty: 90 TABLET | Refills: 1 | Status: SHIPPED | OUTPATIENT
Start: 2024-01-11

## 2024-01-11 RX ADMIN — LOSARTAN POTASSIUM 25 MG: 25 TABLET, FILM COATED ORAL at 10:12

## 2024-01-11 RX ADMIN — GUAIFENESIN 1200 MG: 600 TABLET ORAL at 10:12

## 2024-01-11 RX ADMIN — AMLODIPINE BESYLATE 10 MG: 10 TABLET ORAL at 10:12

## 2024-01-11 ASSESSMENT — PAIN SCALES - GENERAL: PAINLEVEL_OUTOF10: 0

## 2024-01-11 NOTE — PROGRESS NOTES
END OF SHIFT NOTE:    Sitter at bedside. Body tem this shift between 97.7-98.4, berr hugger continues in use.     INTAKE/OUTPUT  01/10 0701 - 01/11 0700  In: -   Out: 100 [Urine:100]  Voiding: Yes, incontinent briefs.  Catheter: No  Drain:              Flatus: Patient does have flatus present.    Stool:  1 occurrences.    Characteristics:  Stool Appearance: Soft  Stool Color: Brown  Stool Amount: Small  Stool Assessment  Incontinence: Yes  Stool Appearance: Soft  Stool Color: Brown  Stool Amount: Small  Stool Source: Rectum  Last BM (including prior to admit): 01/11/24    Emesis: 0  occurrences.    Characteristics:        VITAL SIGNS  Patient Vitals for the past 12 hrs:   Temp Pulse Resp BP SpO2   01/11/24 0344 97.7 °F (36.5 °C) 74 17 (!) 127/96 94 %   01/10/24 2329 98.4 °F (36.9 °C) 80 -- 123/65 93 %   01/10/24 1917 97.9 °F (36.6 °C) 79 12 131/72 93 %       Pain Assessment  Pain Level: 0 (01/10/24 1455)     Patient's Stated Pain Goal: 0 - No pain    Ambulating  Yes, short distances to chair and bed. PT following.     Shift report given to oncoming nurse at the bedside.    Carmella Ha RN

## 2024-01-11 NOTE — PLAN OF CARE
Problem: Discharge Planning  Goal: Discharge to home or other facility with appropriate resources  Outcome: Progressing  Flowsheets (Taken 1/10/2024 2000)  Discharge to home or other facility with appropriate resources: Identify barriers to discharge with patient and caregiver     Problem: Safety - Adult  Goal: Free from fall injury  Outcome: Progressing  Flowsheets (Taken 1/10/2024 2000)  Free From Fall Injury: Instruct family/caregiver on patient safety     Problem: Pain  Goal: Verbalizes/displays adequate comfort level or baseline comfort level  Outcome: Progressing     Problem: Skin/Tissue Integrity  Goal: Absence of new skin breakdown  Description: 1.  Monitor for areas of redness and/or skin breakdown  2.  Assess vascular access sites hourly  3.  Every 4-6 hours minimum:  Change oxygen saturation probe site  4.  Every 4-6 hours:  If on nasal continuous positive airway pressure, respiratory therapy assess nares and determine need for appliance change or resting period.  Outcome: Progressing     Problem: Safety - Medical Restraint  Goal: Remains free of injury from restraints (Restraint for Interference with Medical Device)  Description: INTERVENTIONS:  1. Determine that other, less restrictive measures have been tried or would not be effective before applying the restraint  2. Evaluate the patient's condition at the time of restraint application  3. Inform patient/family regarding the reason for restraint  4. Q2H: Monitor safety, psychosocial status, comfort, nutrition and hydration  Outcome: Progressing     Problem: ABCDS Injury Assessment  Goal: Absence of physical injury  Outcome: Progressing

## 2024-01-11 NOTE — CARE COORDINATION
Patient has discharge orders in on this day. Patient's discharge plan is to go to [facility] for STR. Patient and patient's son Elroy were made aware and are both in agreement with this discharge today.     CM spoke to Jalyn who confirmed patient can be discharged to go to [facility] today.   Room: [facility]  Report: [phone]    Transport arranged for 3:30P.M. Interdisciplinary team made aware. No CM needs voiced or noted at this time.     ASSESSMENT NOTE    Attending Physician: Pam Kruger MD  Admit Problem: Altered mental state [R41.82]  Hypothermia, initial encounter [T68.XXXA]  Altered mental status, unspecified altered mental status type [R41.82]  Date/Time of Admission: 1/2/2024  6:30 AM  Problem List:  Patient Active Problem List   Diagnosis    Constipation    Anal stenosis    Essential (primary) hypertension    Chest pain    Seizure (HCC)    CKD (chronic kidney disease) stage 3, GFR 30-59 ml/min (HCC)    Hx of adenomatous colonic polyps    Altered mental state    Hypothermia    Bradycardia       Service Assessment  Patient Orientation Unable to Assess   Cognition Severely Impaired   History Provided By Other (see comment) ([employer] [employer])   Primary Caregiver Self   Accompanied By/Relationship n/a   Support Systems Other (Comment) ([facility] [employer] Staff)   Patient's Healthcare Decision Maker is: Legal Next of Kin   PCP Verified by CM Yes   Last Visit to PCP     Prior Functional Level Independent in ADLs/IADLs   Current Functional Level Assistance with the following:, Bathing, Dressing, Mobility, Toileting   Can patient return to prior living arrangement Yes   Ability to make needs known: Unable   Family able to assist with home care needs: Other (comment) ([employer] staff)   Would you like for me to discuss the discharge plan with any other family members/significant others, and if so, who? No   Financial Resources Medicare, Medicaid   Community Resources [facility]   CM/SW Referral

## 2024-01-11 NOTE — CARE COORDINATION
Transport packet complete (2H&Ps, 2 facesheets, PASSAR, Physician certification, PPD results, COVID results, discharge summary, PT/OT notes, med notes, prescriptions. PASSAR copied and scanned to medical records.     No CM needs voiced or noted at this time.

## 2024-01-11 NOTE — ADT AUTH CERT
Name Patient ID SSN Gender Identity Birth Date   Oksana Wong [id number]  Male [birth year] (88 yrs)     Address Phone Email Employer    [address] [place] [address] [phone] (H)   [phone] (W)   [phone] (M) -- RETIRED      County Race Occupation Emp Status    [place] (non-) -- Retired      Reg Status PCP Date Last Verified Next Review Date    Verified Rachael Sexton CARMEN - NP  [phone]      Admission Date Discharge Date Admitting Provider     24 --       Marital Status Congregation      Single [employer]        Emergency Contact 1 Emergency Contact 2 Emergency Contact 3 Emergency Contact 4 Emergency Contact 5   Lisa Marvin (D)   [phone] (H) Edwin Teague (O)   [phone] (M) Saeid Grigsby (O)   [phone] (M) Elroy Boyer (5)   [phone] (M) LETY Reed (5)   [phone] (H)     Subscriber Details  Hospital Account #[id number]  [employer] Subscriber Name/Sex/Relation Subscriber  Subscriber Address/Phone Subscriber Emp/Emp Phone   1. ABSOLUTE [employer] C[employer] MEDICARE   [id number] OKSANA WONG - Male   (Self) 1935 [address] [place] [address]  [id number] [phone](H)   [phone](O) RETIRED    2. ABSOLUTE TOTAL CARE MEDICAID   [id number] OKSANA WONG - Male   (Self) 1935 [address] [place] [address]  [id number]   [phone](H)   [phone](O) RETIRED      Utilization Reviews       1/10   Last Updated by PEREZ GATES on 2024 0907     Review Status Created By   In Primary PEREZ GATES       Review Type Associated Date   -- 2024      Criteria Review   DATE: 01/10/2024 Acute Medical     PERTINENT UPDATES:  per Hospitalist:  Neuro work up, infectious work up, and endocrine/adrenal work up all negative.     1:1 sitter nearby. Pt with some labile axillary temps reported by staff but asymptomatic on exam. Oriented to self only. Hallucinations reported by staff.     -Pending  Pending insurance Auth.        Non-peripheral Lines and Tubes (if present):         Telemetry (if present):  Cardiac/Telemetry Monitor On: Portable telemetry pack applied           MEDICATIONS:     norvasc 10 mg po qd mucinex 1200 mg po bid cozaar 25 mg po qd

## 2024-01-11 NOTE — DISCHARGE SUMMARY
Hospitalist Discharge Summary   Admit Date:  2024  6:30 AM   DC Note date: 2024  Name:  Khadar Wong   Age:  88 y.o.  Sex:  male  :  1935   MRN:  [id number]   Room:  /  PCP:  Rachael Sexton APRN - NP    Presenting Complaint: weakness     Initial Admission Diagnosis: Altered mental state [R41.82]  Hypothermia, initial encounter [T68.XXXA]  Altered mental status, unspecified altered mental status type [R41.82]     Problem List for this Hospitalization (present on admission):    Principal Problem:    Altered mental state  Active Problems:    Essential (primary) hypertension    Hypothermia    Bradycardia  Resolved Problems:    * No resolved hospital problems. *      Hospital Course:  Khadar Wong is a 88 y.o. male with medical history of hypertension, stenosis of anal canal who presented with altered mental status.  Unable to obtain any further history due to patient's current baseline status.  Patient was brought in from [facility] with weakness and confusion.     In the ER, patient was found to be hypothermic with a temperature of 94.6.  Labs were grossly unremarkable.  Patient did receive 1 dose of antibiotics placed on Maria Eugenia hugger and he was admitted for further workup. Neuro work up, infectious work up, and endocrine/adrenal work up all negative.     Discharging to [facility] on amlodipine and losartan. Follow up with primary care physician after discharge.     Disposition: LTAC   Diet: ADULT DIET; Dysphagia - Soft and Bite Sized  Code Status: Full Code    Follow Ups:   Contact information for follow-up providers     Rachael Sexton APRN - NP Follow up in 1 week(s).    Specialty: Nurse Practitioner  Contact information:  [address] [facility] [address] [phone]                   Contact information for after-discharge care     Discharge Destination     [facility] .    Service: Skilled Nursing  Contact information:  [address]  [facility].    Signed:  Pam Kruger MD    Part of this note may have been written by using a voice dictation software.  The note has been proof read but may still contain some grammatical/other typographical errors.